# Patient Record
Sex: FEMALE
[De-identification: names, ages, dates, MRNs, and addresses within clinical notes are randomized per-mention and may not be internally consistent; named-entity substitution may affect disease eponyms.]

---

## 2018-05-10 ENCOUNTER — RESULT REVIEW (OUTPATIENT)
Age: 28
End: 2018-05-10

## 2018-12-13 ENCOUNTER — APPOINTMENT (OUTPATIENT)
Dept: ANTEPARTUM | Facility: CLINIC | Age: 28
End: 2018-12-13
Payer: COMMERCIAL

## 2018-12-13 ENCOUNTER — ASOB RESULT (OUTPATIENT)
Age: 28
End: 2018-12-13

## 2018-12-13 ENCOUNTER — APPOINTMENT (OUTPATIENT)
Dept: MATERNAL FETAL MEDICINE | Facility: CLINIC | Age: 28
End: 2018-12-13
Payer: COMMERCIAL

## 2018-12-13 ENCOUNTER — RECORD ABSTRACTING (OUTPATIENT)
Age: 28
End: 2018-12-13

## 2018-12-13 VITALS
BODY MASS INDEX: 26.66 KG/M2 | HEART RATE: 88 BPM | WEIGHT: 160 LBS | HEIGHT: 65 IN | DIASTOLIC BLOOD PRESSURE: 70 MMHG | SYSTOLIC BLOOD PRESSURE: 104 MMHG

## 2018-12-13 DIAGNOSIS — Z87.09 PERSONAL HISTORY OF OTHER DISEASES OF THE RESPIRATORY SYSTEM: ICD-10-CM

## 2018-12-13 DIAGNOSIS — Z84.81 FAMILY HISTORY OF CARRIER OF GENETIC DISEASE: ICD-10-CM

## 2018-12-13 DIAGNOSIS — Z82.79 FAMILY HISTORY OF OTHER CONGENITAL MALFORMATIONS, DEFORMATIONS AND CHROMOSOMAL ABNORMALITIES: ICD-10-CM

## 2018-12-13 LAB — CYTOLOGY CVX/VAG DOC THIN PREP: NORMAL

## 2018-12-13 PROCEDURE — 76811 OB US DETAILED SNGL FETUS: CPT

## 2018-12-13 PROCEDURE — 99243 OFF/OP CNSLTJ NEW/EST LOW 30: CPT

## 2018-12-14 ENCOUNTER — APPOINTMENT (OUTPATIENT)
Dept: OBGYN | Facility: CLINIC | Age: 28
End: 2018-12-14
Payer: COMMERCIAL

## 2018-12-14 VITALS
HEIGHT: 65 IN | BODY MASS INDEX: 26.99 KG/M2 | DIASTOLIC BLOOD PRESSURE: 62 MMHG | WEIGHT: 162 LBS | SYSTOLIC BLOOD PRESSURE: 118 MMHG

## 2018-12-14 PROCEDURE — 81003 URINALYSIS AUTO W/O SCOPE: CPT | Mod: QW

## 2018-12-14 PROCEDURE — 0502F SUBSEQUENT PRENATAL CARE: CPT

## 2018-12-15 LAB
BILIRUB UR QL STRIP: NORMAL
GLUCOSE UR-MCNC: NORMAL
HCG UR QL: 0.2 EU/DL
HGB UR QL STRIP.AUTO: NORMAL
KETONES UR-MCNC: NORMAL
LEUKOCYTE ESTERASE UR QL STRIP: NORMAL
NITRITE UR QL STRIP: NORMAL
PH UR STRIP: 7.5
PROT UR STRIP-MCNC: NORMAL
SP GR UR STRIP: 1.01

## 2019-01-02 ENCOUNTER — TRANSCRIPTION ENCOUNTER (OUTPATIENT)
Age: 29
End: 2019-01-02

## 2019-01-10 ENCOUNTER — APPOINTMENT (OUTPATIENT)
Dept: OBGYN | Facility: CLINIC | Age: 29
End: 2019-01-10
Payer: COMMERCIAL

## 2019-01-10 ENCOUNTER — APPOINTMENT (OUTPATIENT)
Dept: OBGYN | Facility: CLINIC | Age: 29
End: 2019-01-10

## 2019-01-10 VITALS
BODY MASS INDEX: 27.32 KG/M2 | DIASTOLIC BLOOD PRESSURE: 72 MMHG | HEIGHT: 65 IN | SYSTOLIC BLOOD PRESSURE: 110 MMHG | WEIGHT: 164 LBS

## 2019-01-10 DIAGNOSIS — Z00.00 ENCOUNTER FOR GENERAL ADULT MEDICAL EXAMINATION W/OUT ABNORMAL FINDINGS: ICD-10-CM

## 2019-01-10 PROCEDURE — 0502F SUBSEQUENT PRENATAL CARE: CPT

## 2019-01-17 LAB
BILIRUB UR QL STRIP: NORMAL
CLARITY UR: CLEAR
COLLECTION METHOD: NORMAL
GLUCOSE UR-MCNC: NORMAL
HCG UR QL: 0.2 EU/DL
HGB UR QL STRIP.AUTO: NORMAL
KETONES UR-MCNC: NORMAL
LEUKOCYTE ESTERASE UR QL STRIP: NORMAL
NITRITE UR QL STRIP: NORMAL
PH UR STRIP: 6.5
PROT UR STRIP-MCNC: NORMAL
SP GR UR STRIP: 1.01

## 2019-02-07 ENCOUNTER — APPOINTMENT (OUTPATIENT)
Dept: ANTEPARTUM | Facility: CLINIC | Age: 29
End: 2019-02-07
Payer: COMMERCIAL

## 2019-02-07 ENCOUNTER — APPOINTMENT (OUTPATIENT)
Dept: MATERNAL FETAL MEDICINE | Facility: CLINIC | Age: 29
End: 2019-02-07
Payer: COMMERCIAL

## 2019-02-07 ENCOUNTER — ASOB RESULT (OUTPATIENT)
Age: 29
End: 2019-02-07

## 2019-02-07 VITALS
WEIGHT: 167 LBS | BODY MASS INDEX: 27.82 KG/M2 | HEART RATE: 98 BPM | SYSTOLIC BLOOD PRESSURE: 110 MMHG | DIASTOLIC BLOOD PRESSURE: 60 MMHG | HEIGHT: 65 IN

## 2019-02-07 PROCEDURE — 76820 UMBILICAL ARTERY ECHO: CPT

## 2019-02-07 PROCEDURE — 76819 FETAL BIOPHYS PROFIL W/O NST: CPT

## 2019-02-07 PROCEDURE — 76816 OB US FOLLOW-UP PER FETUS: CPT

## 2019-02-07 PROCEDURE — 99242 OFF/OP CONSLTJ NEW/EST SF 20: CPT

## 2019-02-07 RX ORDER — OXYMETAZOLINE HCL 0.05 %
0.05 SPRAY, NON-AEROSOL (ML) NASAL
Refills: 0 | Status: DISCONTINUED | COMMUNITY
End: 2019-02-07

## 2019-02-07 NOTE — DISCUSSION/SUMMARY
[FreeTextEntry1] : Please see the previous consultation for the patient's medical and obsterical history.\par \par Patient is being seen today at approximately 28 weeks for Low WATSON-A at the 5th %. She's currently taking baby aspirin 81 mg p.o. daily. A growth scan was performed today which does reveal a single viable intrauterine gestation with the estimated fetal weight at 3 lbs. 1 oz. which is consistent with the 74th percentile. Vertex presentation with posterior placenta is seen and the amniotic fluid index is normal at 16.59 cm. Umbilical and uterine artery Doppler studies were performed and are within normal limits. Vital signs today reveal a blood pressure of 110/60 and maternal weight is 167 pounds which is a 3 pound weight gain from previous evaluation done on January 10. This is consistent with a BMI of 27.79KG.\par \par Management of the pregnancy was discussed. The patient will continue baby aspirin 81 mg p.o. daily until 36 weeks. A follow up growth scan between 34 and 36 weeks is recommended. The patient is scheduled for her glucose test next week and will return if clinically indicated. All of the above was discussed with the patient and her  and all of her questions were answered.\par \par Recommendations;\par \par #1. Baby aspirin 81 mg p.o. daily until 36 weeks.\par #2. Followup growth scan between 34 and 36 weeks.\par #3. Followup maternal fetal medicine consultation as clinically indicated

## 2019-02-11 ENCOUNTER — APPOINTMENT (OUTPATIENT)
Dept: OBGYN | Facility: CLINIC | Age: 29
End: 2019-02-11
Payer: COMMERCIAL

## 2019-02-11 ENCOUNTER — LABORATORY RESULT (OUTPATIENT)
Age: 29
End: 2019-02-11

## 2019-02-11 VITALS
HEIGHT: 65 IN | WEIGHT: 173 LBS | DIASTOLIC BLOOD PRESSURE: 70 MMHG | SYSTOLIC BLOOD PRESSURE: 118 MMHG | BODY MASS INDEX: 28.82 KG/M2

## 2019-02-11 PROCEDURE — 36415 COLL VENOUS BLD VENIPUNCTURE: CPT

## 2019-02-11 PROCEDURE — 0502F SUBSEQUENT PRENATAL CARE: CPT

## 2019-02-12 ENCOUNTER — OTHER (OUTPATIENT)
Age: 29
End: 2019-02-12

## 2019-02-12 LAB
BASOPHILS # BLD AUTO: 0 K/UL
BASOPHILS NFR BLD AUTO: 0 %
EOSINOPHIL # BLD AUTO: 0.19 K/UL
EOSINOPHIL NFR BLD AUTO: 1.7 %
GLUCOSE 1H P 50 G GLC PO SERPL-MCNC: 150 MG/DL
HCT VFR BLD CALC: 37.4 %
HGB BLD-MCNC: 11.5 G/DL
LYMPHOCYTES # BLD AUTO: 2.09 K/UL
LYMPHOCYTES NFR BLD AUTO: 18.5 %
MAN DIFF?: NORMAL
MCHC RBC-ENTMCNC: 29.9 PG
MCHC RBC-ENTMCNC: 30.7 GM/DL
MCV RBC AUTO: 97.4 FL
MONOCYTES # BLD AUTO: 0.95 K/UL
MONOCYTES NFR BLD AUTO: 8.4 %
NEUTROPHILS # BLD AUTO: 7.9 K/UL
NEUTROPHILS NFR BLD AUTO: 69.8 %
PLATELET # BLD AUTO: 275 K/UL
RBC # BLD: 3.84 M/UL
RBC # FLD: 14.1 %
WBC # FLD AUTO: 11.32 K/UL

## 2019-02-14 ENCOUNTER — RX CHANGE (OUTPATIENT)
Age: 29
End: 2019-02-14

## 2019-02-22 LAB
GLUCOSE 1H P 100 G GLC PO SERPL-MCNC: 197 MG/DL
GLUCOSE 2H P CHAL SERPL-MCNC: 169 MG/DL
GLUCOSE 3H P CHAL SERPL-MCNC: 130 MG/DL
GLUCOSE BS SERPL-MCNC: 76 MG/DL

## 2019-02-27 ENCOUNTER — APPOINTMENT (OUTPATIENT)
Dept: OBGYN | Facility: CLINIC | Age: 29
End: 2019-02-27
Payer: COMMERCIAL

## 2019-02-27 VITALS
BODY MASS INDEX: 28.32 KG/M2 | DIASTOLIC BLOOD PRESSURE: 68 MMHG | HEIGHT: 65 IN | WEIGHT: 170 LBS | SYSTOLIC BLOOD PRESSURE: 118 MMHG

## 2019-02-27 LAB
BILIRUB UR QL STRIP: NORMAL
CLARITY UR: CLEAR
COLLECTION METHOD: NORMAL
GLUCOSE UR-MCNC: NORMAL
HCG UR QL: 0.2 EU/DL
HGB UR QL STRIP.AUTO: ABNORMAL
KETONES UR-MCNC: ABNORMAL
LEUKOCYTE ESTERASE UR QL STRIP: ABNORMAL
NITRITE UR QL STRIP: NORMAL
PH UR STRIP: 6
PROT UR STRIP-MCNC: NORMAL
SP GR UR STRIP: 1.01

## 2019-02-27 PROCEDURE — 0502F SUBSEQUENT PRENATAL CARE: CPT

## 2019-02-27 PROCEDURE — 90715 TDAP VACCINE 7 YRS/> IM: CPT

## 2019-02-27 PROCEDURE — 90471 IMMUNIZATION ADMIN: CPT

## 2019-03-05 ENCOUNTER — ASOB RESULT (OUTPATIENT)
Age: 29
End: 2019-03-05

## 2019-03-05 ENCOUNTER — APPOINTMENT (OUTPATIENT)
Dept: MATERNAL FETAL MEDICINE | Facility: CLINIC | Age: 29
End: 2019-03-05
Payer: COMMERCIAL

## 2019-03-05 VITALS — HEIGHT: 65 IN | BODY MASS INDEX: 27.88 KG/M2 | WEIGHT: 167.31 LBS

## 2019-03-05 PROCEDURE — G0108 DIAB MANAGE TRN  PER INDIV: CPT

## 2019-03-14 ENCOUNTER — APPOINTMENT (OUTPATIENT)
Dept: OBGYN | Facility: CLINIC | Age: 29
End: 2019-03-14
Payer: COMMERCIAL

## 2019-03-14 VITALS
SYSTOLIC BLOOD PRESSURE: 110 MMHG | DIASTOLIC BLOOD PRESSURE: 60 MMHG | HEIGHT: 65 IN | WEIGHT: 169 LBS | BODY MASS INDEX: 28.16 KG/M2

## 2019-03-14 LAB
BILIRUB UR QL STRIP: NORMAL
GLUCOSE UR-MCNC: NORMAL
HCG UR QL: 0.2 EU/DL
HGB UR QL STRIP.AUTO: NORMAL
KETONES UR-MCNC: NORMAL
LEUKOCYTE ESTERASE UR QL STRIP: NORMAL
NITRITE UR QL STRIP: NORMAL
PH UR STRIP: 6
PROT UR STRIP-MCNC: NORMAL
SP GR UR STRIP: 1.02

## 2019-03-14 PROCEDURE — 0502F SUBSEQUENT PRENATAL CARE: CPT

## 2019-03-19 ENCOUNTER — APPOINTMENT (OUTPATIENT)
Dept: ANTEPARTUM | Facility: CLINIC | Age: 29
End: 2019-03-19

## 2019-03-21 ENCOUNTER — ASOB RESULT (OUTPATIENT)
Age: 29
End: 2019-03-21

## 2019-03-21 ENCOUNTER — APPOINTMENT (OUTPATIENT)
Dept: MATERNAL FETAL MEDICINE | Facility: CLINIC | Age: 29
End: 2019-03-21
Payer: COMMERCIAL

## 2019-03-21 ENCOUNTER — APPOINTMENT (OUTPATIENT)
Dept: ANTEPARTUM | Facility: CLINIC | Age: 29
End: 2019-03-21
Payer: COMMERCIAL

## 2019-03-21 VITALS
SYSTOLIC BLOOD PRESSURE: 112 MMHG | WEIGHT: 169 LBS | DIASTOLIC BLOOD PRESSURE: 68 MMHG | HEIGHT: 65 IN | BODY MASS INDEX: 28.16 KG/M2 | HEART RATE: 82 BPM

## 2019-03-21 PROCEDURE — 76820 UMBILICAL ARTERY ECHO: CPT

## 2019-03-21 PROCEDURE — 76819 FETAL BIOPHYS PROFIL W/O NST: CPT

## 2019-03-21 PROCEDURE — 93976 VASCULAR STUDY: CPT

## 2019-03-21 PROCEDURE — 99243 OFF/OP CNSLTJ NEW/EST LOW 30: CPT

## 2019-03-21 PROCEDURE — 76816 OB US FOLLOW-UP PER FETUS: CPT

## 2019-03-21 NOTE — DISCUSSION/SUMMARY
[FreeTextEntry1] : Please see the previous consultation for the patient's medical and obstetrical history.\par \par Since the last consultation the patient has been diagnosed with gestational diabetes and has had nutritional counseling. Evaluation of her diabetic flow sheets reveals that all of her fasting and the majority of her postprandial blood sugars are found to be within normal limits. A growth scan was performed today which does reveal a single viable intrauterne gestation with estimated fetal weight of 5 lbs. 8 oz. which is consistent with the 69th percentile. Vertex presentation with a posterior placenta was seen and the amniotic fluid index is normal at 13.19 cm. Vital signs today reveal blood pressure 112/68 and her weight is 169 pounds which is a 1-1/2 weight gain from evaluation done on March 5th. This is consistent with a BMI of 28.12KG.\par \par Management of the pregnancy was discussed. The patient will continue on baby aspirin until 36 weeks. In addition she will continue with the current ADA diet. Medical intervention is not indicated at this time. She will return in 3 weeks for weekly biophysical profile and NST and will have a growth scan done in one month. A followup consultation with nutrition has been scheduled. All of the above was discussed with the patient and her  and all of their questions were answered.\par \par Recommendations;\par \par #1. Continue current ADA diet.\par #2. Discontinue baby aspirin at 36 weeks.\par #3. Weekly biophysical profile and NST until delivery beginning at 37 weeks.\par #4. Growth scan one month is recommended.\par #5. Followup nutritional consultation has been scheduled.\par #6. Followup maternal fetal medicine consultation if clinically indicated.

## 2019-03-27 ENCOUNTER — APPOINTMENT (OUTPATIENT)
Dept: OBGYN | Facility: CLINIC | Age: 29
End: 2019-03-27
Payer: COMMERCIAL

## 2019-03-27 VITALS
DIASTOLIC BLOOD PRESSURE: 72 MMHG | SYSTOLIC BLOOD PRESSURE: 114 MMHG | WEIGHT: 171 LBS | BODY MASS INDEX: 28.49 KG/M2 | HEIGHT: 65 IN

## 2019-03-27 PROCEDURE — 0502F SUBSEQUENT PRENATAL CARE: CPT

## 2019-03-27 PROCEDURE — 76818 FETAL BIOPHYS PROFILE W/NST: CPT

## 2019-03-28 ENCOUNTER — ASOB RESULT (OUTPATIENT)
Age: 29
End: 2019-03-28

## 2019-03-28 ENCOUNTER — APPOINTMENT (OUTPATIENT)
Dept: MATERNAL FETAL MEDICINE | Facility: CLINIC | Age: 29
End: 2019-03-28
Payer: COMMERCIAL

## 2019-03-28 VITALS — BODY MASS INDEX: 28.2 KG/M2 | WEIGHT: 169.25 LBS | HEIGHT: 65 IN

## 2019-03-28 PROCEDURE — G0108 DIAB MANAGE TRN  PER INDIV: CPT

## 2019-03-30 LAB
BILIRUB UR QL STRIP: NORMAL
COLLECTION METHOD: NORMAL
GLUCOSE UR-MCNC: NORMAL
HCG UR QL: 0.2 EU/DL
HGB UR QL STRIP.AUTO: NORMAL
KETONES UR-MCNC: ABNORMAL
LEUKOCYTE ESTERASE UR QL STRIP: ABNORMAL
NITRITE UR QL STRIP: NORMAL
PH UR STRIP: 6.5
PROT UR STRIP-MCNC: NORMAL
SP GR UR STRIP: 1.02

## 2019-04-04 ENCOUNTER — APPOINTMENT (OUTPATIENT)
Dept: OBGYN | Facility: CLINIC | Age: 29
End: 2019-04-04
Payer: COMMERCIAL

## 2019-04-04 ENCOUNTER — ASOB RESULT (OUTPATIENT)
Age: 29
End: 2019-04-04

## 2019-04-04 VITALS
WEIGHT: 171 LBS | DIASTOLIC BLOOD PRESSURE: 64 MMHG | SYSTOLIC BLOOD PRESSURE: 124 MMHG | BODY MASS INDEX: 28.49 KG/M2 | HEIGHT: 65 IN

## 2019-04-04 PROCEDURE — 76818 FETAL BIOPHYS PROFILE W/NST: CPT

## 2019-04-04 PROCEDURE — 0502F SUBSEQUENT PRENATAL CARE: CPT

## 2019-04-07 LAB
B-HEM STREP SPEC QL CULT: NORMAL
BILIRUB UR QL STRIP: NORMAL
COLLECTION METHOD: NORMAL
GLUCOSE UR-MCNC: NORMAL
HCG UR QL: 0.2 EU/DL
HGB UR QL STRIP.AUTO: NORMAL
HIV1+2 AB SPEC QL IA.RAPID: NONREACTIVE
KETONES UR-MCNC: NORMAL
LEUKOCYTE ESTERASE UR QL STRIP: ABNORMAL
NITRITE UR QL STRIP: NORMAL
PH UR STRIP: 6
PROT UR STRIP-MCNC: NORMAL
SP GR UR STRIP: <=1.005

## 2019-04-09 ENCOUNTER — APPOINTMENT (OUTPATIENT)
Dept: ANTEPARTUM | Facility: CLINIC | Age: 29
End: 2019-04-09

## 2019-04-11 ENCOUNTER — APPOINTMENT (OUTPATIENT)
Dept: OBGYN | Facility: CLINIC | Age: 29
End: 2019-04-11
Payer: COMMERCIAL

## 2019-04-11 VITALS
DIASTOLIC BLOOD PRESSURE: 60 MMHG | BODY MASS INDEX: 28.16 KG/M2 | WEIGHT: 169 LBS | SYSTOLIC BLOOD PRESSURE: 114 MMHG | HEIGHT: 65 IN

## 2019-04-11 LAB
BILIRUB UR QL STRIP: NORMAL
COLLECTION METHOD: NORMAL
GLUCOSE UR-MCNC: NORMAL
HCG UR QL: 0.2 EU/DL
HGB UR QL STRIP.AUTO: NORMAL
KETONES UR-MCNC: NORMAL
LEUKOCYTE ESTERASE UR QL STRIP: ABNORMAL
NITRITE UR QL STRIP: NORMAL
PH UR STRIP: 5.5
PROT UR STRIP-MCNC: NORMAL
SP GR UR STRIP: 1

## 2019-04-11 PROCEDURE — 0502F SUBSEQUENT PRENATAL CARE: CPT

## 2019-04-11 PROCEDURE — 76818 FETAL BIOPHYS PROFILE W/NST: CPT

## 2019-04-16 ENCOUNTER — APPOINTMENT (OUTPATIENT)
Dept: ANTEPARTUM | Facility: CLINIC | Age: 29
End: 2019-04-16
Payer: COMMERCIAL

## 2019-04-16 ENCOUNTER — ASOB RESULT (OUTPATIENT)
Age: 29
End: 2019-04-16

## 2019-04-16 ENCOUNTER — APPOINTMENT (OUTPATIENT)
Dept: MATERNAL FETAL MEDICINE | Facility: CLINIC | Age: 29
End: 2019-04-16
Payer: COMMERCIAL

## 2019-04-16 VITALS
BODY MASS INDEX: 28.49 KG/M2 | DIASTOLIC BLOOD PRESSURE: 78 MMHG | HEIGHT: 65 IN | SYSTOLIC BLOOD PRESSURE: 122 MMHG | WEIGHT: 171 LBS

## 2019-04-16 PROCEDURE — 76816 OB US FOLLOW-UP PER FETUS: CPT

## 2019-04-16 PROCEDURE — 76820 UMBILICAL ARTERY ECHO: CPT

## 2019-04-16 PROCEDURE — 93976 VASCULAR STUDY: CPT

## 2019-04-16 PROCEDURE — 76819 FETAL BIOPHYS PROFIL W/O NST: CPT

## 2019-04-16 PROCEDURE — 99244 OFF/OP CNSLTJ NEW/EST MOD 40: CPT

## 2019-04-16 NOTE — DATA REVIEWED
[FreeTextEntry1] : Sonogram today shows the fetus at the appropriate size at the 41st percentile. \par \par Review of glucose values show excellent control of fasting and post prandial values.

## 2019-04-18 ENCOUNTER — APPOINTMENT (OUTPATIENT)
Dept: OBGYN | Facility: CLINIC | Age: 29
End: 2019-04-18

## 2019-04-18 VITALS
BODY MASS INDEX: 28.16 KG/M2 | HEIGHT: 65 IN | WEIGHT: 169 LBS | DIASTOLIC BLOOD PRESSURE: 66 MMHG | SYSTOLIC BLOOD PRESSURE: 116 MMHG

## 2019-04-20 LAB
BILIRUB UR QL STRIP: NORMAL
COLLECTION METHOD: NORMAL
GLUCOSE UR-MCNC: NORMAL
HCG UR QL: 0.2 EU/DL
HGB UR QL STRIP.AUTO: ABNORMAL
KETONES UR-MCNC: NORMAL
LEUKOCYTE ESTERASE UR QL STRIP: NORMAL
NITRITE UR QL STRIP: NORMAL
PH UR STRIP: 6.5
PROT UR STRIP-MCNC: NORMAL
SP GR UR STRIP: 1.01

## 2019-04-21 ENCOUNTER — INPATIENT (INPATIENT)
Facility: HOSPITAL | Age: 29
LOS: 1 days | Discharge: HOME | End: 2019-04-23
Attending: OBSTETRICS & GYNECOLOGY | Admitting: OBSTETRICS & GYNECOLOGY
Payer: COMMERCIAL

## 2019-04-21 ENCOUNTER — RESULT REVIEW (OUTPATIENT)
Age: 29
End: 2019-04-21

## 2019-04-21 ENCOUNTER — TRANSCRIPTION ENCOUNTER (OUTPATIENT)
Age: 29
End: 2019-04-21

## 2019-04-21 VITALS — HEART RATE: 111 BPM | DIASTOLIC BLOOD PRESSURE: 74 MMHG | SYSTOLIC BLOOD PRESSURE: 123 MMHG

## 2019-04-21 DIAGNOSIS — Z90.89 ACQUIRED ABSENCE OF OTHER ORGANS: Chronic | ICD-10-CM

## 2019-04-21 DIAGNOSIS — Z98.890 OTHER SPECIFIED POSTPROCEDURAL STATES: Chronic | ICD-10-CM

## 2019-04-21 DIAGNOSIS — O47.1 FALSE LABOR AT OR AFTER 37 COMPLETED WEEKS OF GESTATION: ICD-10-CM

## 2019-04-21 LAB
APPEARANCE UR: CLEAR — SIGNIFICANT CHANGE UP
BASOPHILS # BLD AUTO: 0 K/UL — SIGNIFICANT CHANGE UP (ref 0–0.2)
BASOPHILS NFR BLD AUTO: 0.1 % — SIGNIFICANT CHANGE UP (ref 0–2)
BILIRUB UR-MCNC: NEGATIVE — SIGNIFICANT CHANGE UP
BLD GP AB SCN SERPL QL: SIGNIFICANT CHANGE UP
COLOR SPEC: YELLOW — SIGNIFICANT CHANGE UP
DIFF PNL FLD: NEGATIVE — SIGNIFICANT CHANGE UP
EOSINOPHIL # BLD AUTO: 0.1 K/UL — SIGNIFICANT CHANGE UP (ref 0–0.5)
EOSINOPHIL NFR BLD AUTO: 0.6 % — SIGNIFICANT CHANGE UP (ref 0–6)
EPI CELLS # UR: SIGNIFICANT CHANGE UP
GLUCOSE BLDC GLUCOMTR-MCNC: 108 MG/DL — HIGH (ref 70–99)
GLUCOSE BLDC GLUCOMTR-MCNC: 113 MG/DL — HIGH (ref 70–99)
GLUCOSE BLDC GLUCOMTR-MCNC: 80 MG/DL — SIGNIFICANT CHANGE UP (ref 70–99)
GLUCOSE BLDC GLUCOMTR-MCNC: 88 MG/DL — SIGNIFICANT CHANGE UP (ref 70–99)
GLUCOSE UR QL: NEGATIVE MG/DL — SIGNIFICANT CHANGE UP
HCT VFR BLD CALC: 35.9 % — LOW (ref 37–47)
HGB BLD-MCNC: 12.1 G/DL — SIGNIFICANT CHANGE UP (ref 12–16)
KETONES UR-MCNC: NEGATIVE — SIGNIFICANT CHANGE UP
LEUKOCYTE ESTERASE UR-ACNC: NEGATIVE — SIGNIFICANT CHANGE UP
LYMPHOCYTES # BLD AUTO: 18.8 % — LOW (ref 20–55)
LYMPHOCYTES # BLD AUTO: 2.6 K/UL — SIGNIFICANT CHANGE UP (ref 1–4.8)
MCHC RBC-ENTMCNC: 31.3 PG — HIGH (ref 27–31)
MCHC RBC-ENTMCNC: 33.7 G/DL — SIGNIFICANT CHANGE UP (ref 32–36)
MCV RBC AUTO: 92.8 FL — SIGNIFICANT CHANGE UP (ref 81–99)
MONOCYTES # BLD AUTO: 1.1 K/UL — HIGH (ref 0–0.8)
MONOCYTES NFR BLD AUTO: 7.9 % — SIGNIFICANT CHANGE UP (ref 3–10)
NEUTROPHILS # BLD AUTO: 10.1 K/UL — HIGH (ref 1.8–8)
NEUTROPHILS NFR BLD AUTO: 72.2 % — SIGNIFICANT CHANGE UP (ref 37–73)
NITRITE UR-MCNC: NEGATIVE — SIGNIFICANT CHANGE UP
PH UR: 6.5 — SIGNIFICANT CHANGE UP (ref 5–8)
PLATELET # BLD AUTO: 282 K/UL — SIGNIFICANT CHANGE UP (ref 150–400)
PROT UR-MCNC: 15 MG/DL
RBC # BLD: 3.87 M/UL — LOW (ref 4.4–5.2)
RBC # FLD: 13 % — SIGNIFICANT CHANGE UP (ref 11–15.6)
SP GR SPEC: 1.01 — SIGNIFICANT CHANGE UP (ref 1.01–1.02)
T PALLIDUM AB TITR SER: NEGATIVE — SIGNIFICANT CHANGE UP
TYPE + AB SCN PNL BLD: SIGNIFICANT CHANGE UP
UROBILINOGEN FLD QL: NEGATIVE MG/DL — SIGNIFICANT CHANGE UP
WBC # BLD: 14 K/UL — HIGH (ref 4.8–10.8)
WBC # FLD AUTO: 14 K/UL — HIGH (ref 4.8–10.8)

## 2019-04-21 PROCEDURE — 88307 TISSUE EXAM BY PATHOLOGIST: CPT | Mod: 26

## 2019-04-21 PROCEDURE — 59400 OBSTETRICAL CARE: CPT

## 2019-04-21 RX ORDER — SODIUM CHLORIDE 9 MG/ML
3 INJECTION INTRAMUSCULAR; INTRAVENOUS; SUBCUTANEOUS EVERY 8 HOURS
Qty: 0 | Refills: 0 | Status: DISCONTINUED | OUTPATIENT
Start: 2019-04-21 | End: 2019-04-23

## 2019-04-21 RX ORDER — OXYTOCIN 10 UNIT/ML
41.67 VIAL (ML) INJECTION
Qty: 20 | Refills: 0 | Status: DISCONTINUED | OUTPATIENT
Start: 2019-04-21 | End: 2019-04-23

## 2019-04-21 RX ORDER — CITRIC ACID/SODIUM CITRATE 300-500 MG
30 SOLUTION, ORAL ORAL ONCE
Qty: 0 | Refills: 0 | Status: COMPLETED | OUTPATIENT
Start: 2019-04-21 | End: 2019-04-21

## 2019-04-21 RX ORDER — AER TRAVELER 0.5 G/1
1 SOLUTION RECTAL; TOPICAL EVERY 4 HOURS
Qty: 0 | Refills: 0 | Status: DISCONTINUED | OUTPATIENT
Start: 2019-04-21 | End: 2019-04-23

## 2019-04-21 RX ORDER — ACETAMINOPHEN 500 MG
650 TABLET ORAL EVERY 6 HOURS
Qty: 0 | Refills: 0 | Status: DISCONTINUED | OUTPATIENT
Start: 2019-04-21 | End: 2019-04-23

## 2019-04-21 RX ORDER — OXYTOCIN 10 UNIT/ML
333.33 VIAL (ML) INJECTION
Qty: 20 | Refills: 0 | Status: COMPLETED | OUTPATIENT
Start: 2019-04-21 | End: 2019-04-21

## 2019-04-21 RX ORDER — PRAMOXINE HYDROCHLORIDE 150 MG/15G
1 AEROSOL, FOAM RECTAL EVERY 4 HOURS
Qty: 0 | Refills: 0 | Status: DISCONTINUED | OUTPATIENT
Start: 2019-04-21 | End: 2019-04-23

## 2019-04-21 RX ORDER — DOCUSATE SODIUM 100 MG
100 CAPSULE ORAL
Qty: 0 | Refills: 0 | Status: DISCONTINUED | OUTPATIENT
Start: 2019-04-21 | End: 2019-04-23

## 2019-04-21 RX ORDER — DIPHENHYDRAMINE HCL 50 MG
25 CAPSULE ORAL EVERY 6 HOURS
Qty: 0 | Refills: 0 | Status: DISCONTINUED | OUTPATIENT
Start: 2019-04-21 | End: 2019-04-23

## 2019-04-21 RX ORDER — TETANUS TOXOID, REDUCED DIPHTHERIA TOXOID AND ACELLULAR PERTUSSIS VACCINE, ADSORBED 5; 2.5; 8; 8; 2.5 [IU]/.5ML; [IU]/.5ML; UG/.5ML; UG/.5ML; UG/.5ML
0.5 SUSPENSION INTRAMUSCULAR ONCE
Qty: 0 | Refills: 0 | Status: DISCONTINUED | OUTPATIENT
Start: 2019-04-21 | End: 2019-04-23

## 2019-04-21 RX ORDER — OXYTOCIN 10 UNIT/ML
2 VIAL (ML) INJECTION
Qty: 30 | Refills: 0 | Status: DISCONTINUED | OUTPATIENT
Start: 2019-04-21 | End: 2019-04-23

## 2019-04-21 RX ORDER — HYDROCORTISONE 1 %
1 OINTMENT (GRAM) TOPICAL EVERY 4 HOURS
Qty: 0 | Refills: 0 | Status: DISCONTINUED | OUTPATIENT
Start: 2019-04-21 | End: 2019-04-23

## 2019-04-21 RX ORDER — MAGNESIUM HYDROXIDE 400 MG/1
30 TABLET, CHEWABLE ORAL
Qty: 0 | Refills: 0 | Status: DISCONTINUED | OUTPATIENT
Start: 2019-04-21 | End: 2019-04-23

## 2019-04-21 RX ORDER — LANOLIN
1 OINTMENT (GRAM) TOPICAL EVERY 6 HOURS
Qty: 0 | Refills: 0 | Status: DISCONTINUED | OUTPATIENT
Start: 2019-04-21 | End: 2019-04-23

## 2019-04-21 RX ORDER — IBUPROFEN 200 MG
600 TABLET ORAL EVERY 6 HOURS
Qty: 0 | Refills: 0 | Status: DISCONTINUED | OUTPATIENT
Start: 2019-04-21 | End: 2019-04-23

## 2019-04-21 RX ORDER — OXYTOCIN 10 UNIT/ML
333.33 VIAL (ML) INJECTION
Qty: 20 | Refills: 0 | Status: COMPLETED | OUTPATIENT
Start: 2019-04-21

## 2019-04-21 RX ORDER — GLYCERIN ADULT
1 SUPPOSITORY, RECTAL RECTAL AT BEDTIME
Qty: 0 | Refills: 0 | Status: DISCONTINUED | OUTPATIENT
Start: 2019-04-21 | End: 2019-04-23

## 2019-04-21 RX ORDER — SODIUM CHLORIDE 9 MG/ML
1000 INJECTION, SOLUTION INTRAVENOUS
Qty: 0 | Refills: 0 | Status: DISCONTINUED | OUTPATIENT
Start: 2019-04-21 | End: 2019-04-21

## 2019-04-21 RX ORDER — SIMETHICONE 80 MG/1
80 TABLET, CHEWABLE ORAL EVERY 6 HOURS
Qty: 0 | Refills: 0 | Status: DISCONTINUED | OUTPATIENT
Start: 2019-04-21 | End: 2019-04-23

## 2019-04-21 RX ORDER — BUTORPHANOL TARTRATE 2 MG/ML
2 INJECTION, SOLUTION INTRAMUSCULAR; INTRAVENOUS ONCE
Qty: 0 | Refills: 0 | Status: DISCONTINUED | OUTPATIENT
Start: 2019-04-21 | End: 2019-04-21

## 2019-04-21 RX ORDER — DIBUCAINE 1 %
1 OINTMENT (GRAM) RECTAL EVERY 4 HOURS
Qty: 0 | Refills: 0 | Status: DISCONTINUED | OUTPATIENT
Start: 2019-04-21 | End: 2019-04-23

## 2019-04-21 RX ORDER — OXYCODONE AND ACETAMINOPHEN 5; 325 MG/1; MG/1
2 TABLET ORAL EVERY 6 HOURS
Qty: 0 | Refills: 0 | Status: DISCONTINUED | OUTPATIENT
Start: 2019-04-21 | End: 2019-04-23

## 2019-04-21 RX ORDER — SODIUM CHLORIDE 9 MG/ML
1000 INJECTION, SOLUTION INTRAVENOUS
Qty: 0 | Refills: 0 | Status: DISCONTINUED | OUTPATIENT
Start: 2019-04-21 | End: 2019-04-23

## 2019-04-21 RX ORDER — SODIUM CHLORIDE 9 MG/ML
1000 INJECTION, SOLUTION INTRAVENOUS ONCE
Qty: 0 | Refills: 0 | Status: COMPLETED | OUTPATIENT
Start: 2019-04-21 | End: 2019-04-21

## 2019-04-21 RX ADMIN — Medication 2 MILLIUNIT(S)/MIN: at 07:59

## 2019-04-21 RX ADMIN — SODIUM CHLORIDE 2000 MILLILITER(S): 9 INJECTION, SOLUTION INTRAVENOUS at 09:00

## 2019-04-21 RX ADMIN — Medication 1000 MILLIUNIT(S)/MIN: at 15:11

## 2019-04-21 RX ADMIN — BUTORPHANOL TARTRATE 2 MILLIGRAM(S): 2 INJECTION, SOLUTION INTRAMUSCULAR; INTRAVENOUS at 06:10

## 2019-04-21 RX ADMIN — Medication 600 MILLIGRAM(S): at 21:31

## 2019-04-21 RX ADMIN — Medication 30 MILLILITER(S): at 09:12

## 2019-04-21 RX ADMIN — Medication 600 MILLIGRAM(S): at 20:31

## 2019-04-21 RX ADMIN — SODIUM CHLORIDE 125 MILLILITER(S): 9 INJECTION, SOLUTION INTRAVENOUS at 01:15

## 2019-04-21 RX ADMIN — BUTORPHANOL TARTRATE 2 MILLIGRAM(S): 2 INJECTION, SOLUTION INTRAMUSCULAR; INTRAVENOUS at 05:55

## 2019-04-21 NOTE — OB PROVIDER IHI INDUCTION/AUGMENTATION NOTE - NS_CHECKALL_OBGYN_ALL_OB
Induction / Augmentation was discussed/Order was written/Contractions pattern was reviewed/H&P was completed/FHR was reviewed

## 2019-04-21 NOTE — OB PROVIDER H&P - ATTENDING COMMENTS
Agree with above. Patient with h/o GDMA1 and low WATSON-A, admitted for induction of labor due to PROM. We will start Cytotec induction. GBS negative. Anticipate . Plan of care was discussed with the patient. Consent obtained at bedside.

## 2019-04-21 NOTE — OB PROVIDER H&P - ALERT: PERTINENT HISTORY
Follow up Sonogram for Growth/Ultra Screen at 12 Weeks/20 Week Level II Sonogram/1st Trimester Sonogram/BioPhysical Profile(s)/Fetal Non-Stress Test (NST)

## 2019-04-21 NOTE — OB PROVIDER DELIVERY SUMMARY - NSPROVIDERDELIVERYNOTE_OBGYN_ALL_OB_FT
Procedure: Normal vaginal delivery  Findings: Viable male infant delivered in cephalic presentation at 1507, placenta delivered spontaneously and intact at 1511. Apgar 9/9. Weight 3150g (6 lbs 15oz).  Laceration: 2nd degree perineal and 2nd degree right lateral vaginal wall lacerations  Repair: 2-0 Chromic and 3-0 Chromic sutures  Procedure: Patient felt rectal pressure and was found to be fully dilated, +2 station. She pushed effectively for 30 minutes. In conjunction with maternal effort, she delivered a viable male infant. Placenta delivered intact. Perineum and vagina were inspected and a 2nd degree perineal laceration and a 2nd degree right vaginal wall laceration was noted and both were repaired with chromic suture in layers. Excellent hemostasis obtained.  EBL: [id number] Procedure: Normal spontaneous vaginal delivery    Findings: Viable male infant delivered in cephalic presentation at 15:07. No nuchal cord noted. The placenta was delivered spontaneously and intact at 15:11. APGARs 9/9. Weight 3150 g (6 lbs 15oz).    Laceration: 2nd degree perineal and 2nd degree right lateral vaginal wall lacerations    Repaired with: 2-0 Chromic and 3-0 Chromic sutures    Procedure details: Patient felt rectal pressure and was found to be fully dilated, +2 station. She pushed effectively for 30 minutes. In conjunction with maternal effort, she delivered a viable male infant. No nuchal cord was noted. Placenta was delivered intact. Perineum and vagina were inspected and a 2nd degree perineal laceration and a 2nd degree right vaginal wall laceration was noted. Both the perineal and vaginal wall lacerations were repaired with 2-0 and 3-0 chromic sutures in layers. Uterus was firm after uterine massage and IV Pitocin. Patient was noted to have continued moderate lochia, thus the cervix was inspected and no cervical lacerations were noted. No additional vaginal lacerations were noted. A rectal exam was done and rectum was intact. Excellent hemostasis was obtained.     Complications: None

## 2019-04-21 NOTE — DISCHARGE NOTE OB - PATIENT PORTAL LINK FT
You can access the [facility], offered by [facility], by registering with the following website: [email or web address]

## 2019-04-21 NOTE — CHART NOTE - NSCHARTNOTEFT_GEN_A_CORE
Pt is feeling uncomfortable wants to sleep and requests stadol, does not yet want epidural.  Vital Signs Last 24 Hrs  T(C): 36.7 (21 Apr 2019 05:15), Max: 36.7 (21 Apr 2019 03:01)  T(F): 98.06 (21 Apr 2019 05:15), Max: 98.06 (21 Apr 2019 03:01)  HR: 77 (21 Apr 2019 05:15) (69 - 111)  BP: 101/56 (21 Apr 2019 05:15) (101/56 - 123/74)  RR: 16 (21 Apr 2019 05:15) (15 - 16)    FETAL HEART RATE: 125, moderate, + accels, - decels, moderate variability    [place]: irregular contractions    CERVICAL EXAM: deferred at this time    INTERVENTIONS: given the CAT 1 tracing, administer 2mg of Stadol IV    D/V Dr. Masters Pt is feeling uncomfortable wants to sleep and requests stadol, does not yet want epidural.  Vital Signs Last 24 Hrs  T(C): 36.7 (21 Apr 2019 05:15), Max: 36.7 (21 Apr 2019 03:01)  T(F): 98.06 (21 Apr 2019 05:15), Max: 98.06 (21 Apr 2019 03:01)  HR: 77 (21 Apr 2019 05:15) (69 - 111)  BP: 101/56 (21 Apr 2019 05:15) (101/56 - 123/74)  RR: 16 (21 Apr 2019 05:15) (15 - 16)    FETAL HEART RATE: 125, moderate, + accels, - decels, moderate variability    [place]: irregular contractions    CERVICAL EXAM: deferred at this time    INTERVENTIONS: given the CAT 1 tracing, administer 2mg of Stadol IV    D/V Dr. Masters  ATTENDING NOTE    I was asked by Dr. Caraballo to care for patient until 7 am.  Patient was seen at bedside.  She reports painful contractions.  Desires pain management.   moderate variability accels present, no decels irregular contractions. Patient 30 yo P0 at 38+5 days with premature rupture of membranes undergoing Cytotec induction, s/p 2 doses. CAT FHT. She is stable  I agree with above resident assessment and plan.  POOL Masters MD

## 2019-04-21 NOTE — OB PROVIDER H&P - ASSESSMENT
A/P: Patient is a 30 y/o  at 38 weeks and 5 days dated by LMP, with h/o GDMA1 and Low WATSON-A, admitted for induction of labor due to premature rupture of membranes (PROM).     -Admit to L&D  -Induction of labor with Cytotec due to PROM  -GDMA1- Continue FS glucose every 4 hours in latent phase and every 2 hours in active phase.   -GBS negative  -T&S: O+ positive  -CEFM + [place]  -Anticipate     Plan of care was discussed with the patient. Plan for induction of labor with Cytotec was discussed. All of her questions and concerns were discussed.

## 2019-04-21 NOTE — OB PROVIDER H&P - HISTORY OF PRESENT ILLNESS
Patient is a 28yo  at 38 weeks and 5 days dated by LMP coming in with PROM. Denies cxts, VB. +FM.  Pregnancy complicated by: A1GDM, PAPAA 5%, low UE3 and high BHCG.    PMH: sinisitis  PSH: sinus polyp, T&A  Meds: PNV, baby ASA  Allergies: seasonal, NKDA  GYNhx: none  FH: trisomy 21, CHD, BRCA+ in father     FHT: cat I  [place]: irregular  VE: grossly ruptured with positive nitrazine, /-3  US on 19: vertex, posterior placenta, EFW 6lb 15oz  Bedside US confirms vertex    A/P: Patient is a 28yo  at 38 weeks and 5 days dated by LMP coming in with PROM.  -Admit to L&D  -Start on cytotec for IOL due to PROM  -GBS negative  -ABO O+ positive Patient is a 30 y/o  at 38 weeks and 5 days dated by LMP, coming in with PROM. She reports having leakage of fluid since 22:35 on 19. Denies any contractions or vaginal bleeding. +Fetal movement.    Pregnancy complicated by: A1GDM, WATSON-A 5%, low UE3 and high BHCG.    PMH: Sinusitis  GYN Hx: none  PSH: Sinus polypectomy, Tonsillectomy   Meds: PNV, baby ASA (d/c at 36 weeks)  Allergies: seasonal, NKDA  FH: trisomy 21, CHD, BRCA+ in father     FHT: cat I  [place]: irregular  VE: grossly ruptured with positive nitrazine, /-3  US on 19: vertex, posterior placenta, EFW 6lb 15oz  Bedside US confirms vertex

## 2019-04-21 NOTE — DISCHARGE NOTE OB - MEDICATION SUMMARY - MEDICATIONS TO TAKE
I will START or STAY ON the medications listed below when I get home from the hospital:    ibuprofen 600 mg oral tablet  -- 1 tab(s) by mouth every 6 hours, As Needed -for mild pain   -- Do not take this drug if you are pregnant.  It is very important that you take or use this exactly as directed.  Do not skip doses or discontinue unless directed by your doctor.  May cause drowsiness or dizziness.  Obtain medical advice before taking any non-prescription drugs as some may affect the action of this medication.  Take with food or milk.    -- Indication: For pain    Colace 100 mg oral capsule  -- 1 cap(s) by mouth 2 times a day, As Needed -for constipation   -- Medication should be taken with plenty of water.    -- Indication: For constipation

## 2019-04-21 NOTE — DISCHARGE NOTE OB - CARE PLAN
Principal Discharge DX:	 (spontaneous vaginal delivery)  Goal:	recovery  Assessment and plan of treatment:	delivered via spontaneous vaginal delivery. She was transferred to postpartum unit without complications during her stay. Upon discharge she is voiding, tolerating PO, ambulating, and pain is controlled.

## 2019-04-21 NOTE — CHART NOTE - NSCHARTNOTEFT_GEN_A_CORE
Patient is resting comfortably in bed after receiving an epidural. She has no complaints.    FHT: Baseline 130, Moderate variability with episodic minimal variability, +Accelerations, +Intermittent variable decelerations.  [place]: Ctx q2-4 minutes, Pitocin 14 mU    SVE: 8/100/-1, SROM at 22:35 on     A/P: 28 y/o  at 38 5/7 wks GA, admitted for induction of labor for PROM. Currently in active labor.    -Continue Pitocin for augmentation.  -IUPC in place. CEFM + [place].  -Epidural in place for pain management.  -IVF hydration.  -Anticipate .

## 2019-04-21 NOTE — OB PROVIDER H&P - PMH
Diet controlled gestational diabetes mellitus (GDM) in second trimester Diet controlled gestational diabetes mellitus (GDM) in third trimester

## 2019-04-21 NOTE — DISCHARGE NOTE OB - CARE PROVIDER_API CALL
Kelly Caraballo)  OBSGYN  [facility]  [address] [place] [id number]  Phone: [phone]  Fax: [phone]  Follow Up Time:

## 2019-04-21 NOTE — CHART NOTE - NSCHARTNOTEFT_GEN_A_CORE
Patient is resting comfortably in bed. She received Stadol 2 mg IV for pain control at 05:55.     FHT: Category I- recent minimal variability due to Stadol  [place]: Irregular ctx (Unable to continuously monitor contractions)    SVE: 2-3/70/-3, SROM at 22:35 on     A/P: 30 y/o  at 38 5/7 wks GA, admitted for induction of labor for PROM.    -s/p Cytotec 20 mcg PO x 2.  -Start Pitocin.  -IUPC was placed at bedside due to being unable to monitor contractions.  -CEFM + [place].  -IVF hydration.  -Anticipate .    Plan of care was discussed with the patient and her family. All of their questions and concerns were discussed.

## 2019-04-21 NOTE — DISCHARGE NOTE OB - MATERIALS PROVIDED
Vaccinations/[place]  Screening Program/Breastfeeding Log/Breastfeeding Mother’s Support Group Information/Guide to Postpartum Care/Back To Sleep Handout/Shaken Baby Prevention Handout

## 2019-04-22 LAB
BASOPHILS # BLD AUTO: 0 K/UL — SIGNIFICANT CHANGE UP (ref 0–0.2)
BASOPHILS NFR BLD AUTO: 0.3 % — SIGNIFICANT CHANGE UP (ref 0–2)
EOSINOPHIL # BLD AUTO: 0.1 K/UL — SIGNIFICANT CHANGE UP (ref 0–0.5)
EOSINOPHIL NFR BLD AUTO: 0.6 % — SIGNIFICANT CHANGE UP (ref 0–6)
HCT VFR BLD CALC: 31.8 % — LOW (ref 37–47)
HGB BLD-MCNC: 10.5 G/DL — LOW (ref 12–16)
LYMPHOCYTES # BLD AUTO: 14.9 % — LOW (ref 20–55)
LYMPHOCYTES # BLD AUTO: 2.3 K/UL — SIGNIFICANT CHANGE UP (ref 1–4.8)
MCHC RBC-ENTMCNC: 31 PG — SIGNIFICANT CHANGE UP (ref 27–31)
MCHC RBC-ENTMCNC: 33 G/DL — SIGNIFICANT CHANGE UP (ref 32–36)
MCV RBC AUTO: 93.8 FL — SIGNIFICANT CHANGE UP (ref 81–99)
MONOCYTES # BLD AUTO: 1.4 K/UL — HIGH (ref 0–0.8)
MONOCYTES NFR BLD AUTO: 9.2 % — SIGNIFICANT CHANGE UP (ref 3–10)
NEUTROPHILS # BLD AUTO: 11.5 K/UL — HIGH (ref 1.8–8)
NEUTROPHILS NFR BLD AUTO: 74.4 % — HIGH (ref 37–73)
PLATELET # BLD AUTO: 228 K/UL — SIGNIFICANT CHANGE UP (ref 150–400)
RBC # BLD: 3.39 M/UL — LOW (ref 4.4–5.2)
RBC # FLD: 13.2 % — SIGNIFICANT CHANGE UP (ref 11–15.6)
WBC # BLD: 15.5 K/UL — HIGH (ref 4.8–10.8)
WBC # FLD AUTO: 15.5 K/UL — HIGH (ref 4.8–10.8)

## 2019-04-22 RX ADMIN — Medication 600 MILLIGRAM(S): at 03:16

## 2019-04-22 RX ADMIN — Medication 600 MILLIGRAM(S): at 13:18

## 2019-04-22 RX ADMIN — Medication 600 MILLIGRAM(S): at 04:16

## 2019-04-22 RX ADMIN — Medication 600 MILLIGRAM(S): at 21:05

## 2019-04-22 RX ADMIN — Medication 1 TABLET(S): at 13:17

## 2019-04-22 RX ADMIN — Medication 600 MILLIGRAM(S): at 14:00

## 2019-04-22 RX ADMIN — Medication 600 MILLIGRAM(S): at 20:05

## 2019-04-22 NOTE — PROGRESS NOTE ADULT - ASSESSMENT
29y year old  PPD#1 s/p  at 38 wks 5ds gestation. Expected clinical improvement.     Problem:    1. Normal spontaneous vaginal Delivery ()    -Patient is feeling well and hemodynamically stable  -Tolerating PO and voiding  -c/w pain management PRN  -Encourage breast feeding and ambulation  -c/w routine postpartum care  -Plan for d/c on day #2, pending attending's approval

## 2019-04-22 NOTE — PROGRESS NOTE ADULT - SUBJECTIVE AND OBJECTIVE BOX
29y year old  PPD#1 s/p  at 38 wks 5ds gestation.     No acute overnight events. Pain well controlled.   Patient is ambulating, +voiding, +flatus, +BM  Reports minimal lochia.   +breast feeding, -breast tenderness    VS:   Vital Signs Last 24 Hrs  T(C): 36.8 (2019 19:40), Max: 36.8 (2019 19:40)  T(F): 98.2 (2019 19:40), Max: 98.2 (2019 19:40)  HR: 87 (2019 19:40) (57 - 115)  BP: 106/69 (2019 19:40) (84/51 - 138/60)  RR: 18 (2019 19:40) (17 - 18)  SpO2: 98% (2019 11:20) (94% - 99%)    Physical Exam:  General: NAD, AAOx3  Heart: RRR, Normal S1/S2, No m/g/r  Lungs:CTAB  Abdomen: soft, ND, firm fundus palpated at the umbilicus.   Ext:  No cyanosis, no edema, pulses +2 x4, no Cristy's sign, nontender lower extremity bilaterally.    Labs:                        12.1   14.0  )-----------( 282      ( 2019 01:27 )             35.9       MEDICATIONS  (STANDING):  dextrose 5% + lactated ringers. 1000 milliLiter(s) (125 mL/Hr) IV Continuous <Continuous>  diphtheria/tetanus/pertussis (acellular) Vaccine (ADAcel) 0.5 milliLiter(s) IntraMuscular once  oxytocin Infusion 41.667 milliUNIT(s)/Min (125 mL/Hr) IV Continuous <Continuous>  oxytocin Infusion 2 milliUNIT(s)/Min (2 mL/Hr) IV Continuous <Continuous>  prenatal multivitamin 1 Tablet(s) Oral daily  sodium chloride 0.9% lock flush 3 milliLiter(s) IV Push every 8 hours    MEDICATIONS  (PRN):  acetaminophen   Tablet .. 650 milliGRAM(s) Oral every 6 hours PRN Temp greater or equal to 38.5C (101.3F), Mild Pain (1 - 3)  dibucaine 1% Ointment 1 Application(s) Topical every 4 hours PRN Perineal Discomfort  diphenhydrAMINE 25 milliGRAM(s) Oral every 6 hours PRN Itching  docusate sodium 100 milliGRAM(s) Oral two times a day PRN Stool Softening  glycerin Suppository - Adult 1 Suppository(s) Rectal at bedtime PRN Constipation  hydrocortisone 1% Cream 1 Application(s) Topical every 4 hours PRN Moderate to Severe Perineal Pain  ibuprofen  Tablet. 600 milliGRAM(s) Oral every 6 hours PRN Moderate Pain (4 - 6)  lanolin Ointment 1 Application(s) Topical every 6 hours PRN Sore Nipples  magnesium hydroxide Suspension 30 milliLiter(s) Oral two times a day PRN Constipation  oxyCODONE    5 mG/acetaminophen 325 mG 2 Tablet(s) Oral every 6 hours PRN Severe Pain (7 - 10)  pramoxine 1%/zinc 5% Cream 1 Application(s) Topical every 4 hours PRN Moderate to Severe Perineal Pain  simethicone 80 milliGRAM(s) Chew every 6 hours PRN Gas  witch hazel Pads 1 Application(s) Topical every 4 hours PRN Perineal Discomfort

## 2019-04-23 ENCOUNTER — APPOINTMENT (OUTPATIENT)
Dept: ANTEPARTUM | Facility: CLINIC | Age: 29
End: 2019-04-23

## 2019-04-23 VITALS
DIASTOLIC BLOOD PRESSURE: 74 MMHG | TEMPERATURE: 98 F | SYSTOLIC BLOOD PRESSURE: 130 MMHG | OXYGEN SATURATION: 96 % | RESPIRATION RATE: 18 BRPM | HEART RATE: 108 BPM

## 2019-04-23 PROCEDURE — 82962 GLUCOSE BLOOD TEST: CPT

## 2019-04-23 PROCEDURE — 81001 URINALYSIS AUTO W/SCOPE: CPT

## 2019-04-23 PROCEDURE — 86780 TREPONEMA PALLIDUM: CPT

## 2019-04-23 PROCEDURE — 59050 FETAL MONITOR W/REPORT: CPT

## 2019-04-23 PROCEDURE — G0463: CPT

## 2019-04-23 PROCEDURE — 86901 BLOOD TYPING SEROLOGIC RH(D): CPT

## 2019-04-23 PROCEDURE — 59025 FETAL NON-STRESS TEST: CPT

## 2019-04-23 PROCEDURE — 36415 COLL VENOUS BLD VENIPUNCTURE: CPT

## 2019-04-23 PROCEDURE — 86850 RBC ANTIBODY SCREEN: CPT

## 2019-04-23 PROCEDURE — 86900 BLOOD TYPING SEROLOGIC ABO: CPT

## 2019-04-23 PROCEDURE — 88307 TISSUE EXAM BY PATHOLOGIST: CPT

## 2019-04-23 PROCEDURE — 85027 COMPLETE CBC AUTOMATED: CPT

## 2019-04-23 RX ORDER — IBUPROFEN 200 MG
1 TABLET ORAL
Qty: 28 | Refills: 0
Start: 2019-04-23 | End: 2019-04-29

## 2019-04-23 RX ORDER — DOCUSATE SODIUM 100 MG
1 CAPSULE ORAL
Qty: 14 | Refills: 0
Start: 2019-04-23 | End: 2019-04-29

## 2019-04-23 RX ADMIN — Medication 600 MILLIGRAM(S): at 05:25

## 2019-04-23 RX ADMIN — Medication 600 MILLIGRAM(S): at 14:38

## 2019-04-23 RX ADMIN — Medication 1 TABLET(S): at 13:58

## 2019-04-23 RX ADMIN — Medication 600 MILLIGRAM(S): at 13:58

## 2019-04-23 NOTE — PROGRESS NOTE ADULT - SUBJECTIVE AND OBJECTIVE BOX
S: Patient doing well. Minimal lochia. No complaints.     O: Vital Signs Last 24 Hrs  T(C): 36.8 (2019 08:22), Max: 36.8 (2019 08:22)  T(F): 98.2 (2019 08:22), Max: 98.2 (2019 08:22)  HR: 64 (2019 08:22) (64 - 64)  BP: 102/68 (2019 08:22) (102/68 - 102/68)  BP(mean): --  RR: 18 (2019 08:22) (18 - 18)  SpO2: --    Gen: NAD  Abd: soft, NT, ND, fundus firm below umbilicus  Ext: no tenderness    Labs:                        10.5   15.5  )-----------( 228      ( 2019 07:54 )             31.8        Rubella status:    A: 29y PPD# 2 s/p      Doing well    Plan:  [ ] Discharge home.  Nothing in vagina and no heavy lifting for 6 weeks.   Follow up 6 weeks for post partum visit.  Call office for any fevers, chills, heavy vaginal bleeding, symptoms of depression, or any other concerning symptoms.  Continue motrin 600 mg every 6 hours.

## 2019-04-23 NOTE — PROGRESS NOTE ADULT - SUBJECTIVE AND OBJECTIVE BOX
29y  s/p  at 38 5/7wks gestation PPD#2.   Patient seen and examined at bedside, no acute overnight events.   Patient is ambulating, +eating, +PO hydration, +voiding, +Flatus, -BM, +Formula feeding, +Breast feeding and pain is well controlled.  Denies headache, SOB, fever, chills and calf pain.    VS:   Vital Signs Last 24 Hrs  T(C): 36.6 (2019 08:10), Max: 36.6 (2019 08:10)  T(F): 97.8 (2019 08:10), Max: 97.8 (2019 08:10)  HR: 77 (2019 08:10) (77 - 77)  BP: 91/60 (2019 08:10) (91/60 - 91/60)  RR: 18 (2019 08:10) (18 - 18)    Physical Exam:  General: NAD  Breast: No tenderness or abnormal discharge.  Abdomen: +BS, soft, ND, minimally tender, Fundus firm at level of umbilicus.   Pelvic: Minimal lochia  Ext: No cyanosis, edema or calf tenderness.     Labs:                        10.5   15.5  )-----------( 228      ( 2019 07:54 )             31.8       Medication:  MEDICATIONS  (STANDING):  dextrose 5% + lactated ringers. 1000 milliLiter(s) (125 mL/Hr) IV Continuous <Continuous>  diphtheria/tetanus/pertussis (acellular) Vaccine (ADAcel) 0.5 milliLiter(s) IntraMuscular once  oxytocin Infusion 41.667 milliUNIT(s)/Min (125 mL/Hr) IV Continuous <Continuous>  oxytocin Infusion 2 milliUNIT(s)/Min (2 mL/Hr) IV Continuous <Continuous>  prenatal multivitamin 1 Tablet(s) Oral daily  sodium chloride 0.9% lock flush 3 milliLiter(s) IV Push every 8 hours    MEDICATIONS  (PRN):  acetaminophen   Tablet .. 650 milliGRAM(s) Oral every 6 hours PRN Temp greater or equal to 38.5C (101.3F), Mild Pain (1 - 3)  dibucaine 1% Ointment 1 Application(s) Topical every 4 hours PRN Perineal Discomfort  diphenhydrAMINE 25 milliGRAM(s) Oral every 6 hours PRN Itching  docusate sodium 100 milliGRAM(s) Oral two times a day PRN Stool Softening  glycerin Suppository - Adult 1 Suppository(s) Rectal at bedtime PRN Constipation  hydrocortisone 1% Cream 1 Application(s) Topical every 4 hours PRN Moderate to Severe Perineal Pain  ibuprofen  Tablet. 600 milliGRAM(s) Oral every 6 hours PRN Moderate Pain (4 - 6)  lanolin Ointment 1 Application(s) Topical every 6 hours PRN Sore Nipples  magnesium hydroxide Suspension 30 milliLiter(s) Oral two times a day PRN Constipation  oxyCODONE    5 mG/acetaminophen 325 mG 2 Tablet(s) Oral every 6 hours PRN Severe Pain (7 - 10)  pramoxine 1%/zinc 5% Cream 1 Application(s) Topical every 4 hours PRN Moderate to Severe Perineal Pain  simethicone 80 milliGRAM(s) Chew every 6 hours PRN Gas  witch hazel Pads 1 Application(s) Topical every 4 hours PRN Perineal Discomfort

## 2019-04-23 NOTE — PROGRESS NOTE ADULT - ASSESSMENT
29y  s/p  at 38 5/7wks gestation PPD#2.     1. Normal spontaneous vaginal Delivery ()  -Patient is feeling well and hemodynamically stable, meeting expected milestones.  -Encourage breast feeding and mother-to-baby interaction   -Tolerating PO, voiding and bowel function nml  -C/w pain management PRN.  -Encourage ambulation. If patient is not ambulating please use SCDs for DVT ppx   -C/w routine postpartum care and education   -Plan for d/c on 19, pending attending's approval

## 2019-04-25 ENCOUNTER — APPOINTMENT (OUTPATIENT)
Dept: OBGYN | Facility: CLINIC | Age: 29
End: 2019-04-25

## 2019-04-26 ENCOUNTER — APPOINTMENT (OUTPATIENT)
Dept: OBGYN | Facility: HOSPITAL | Age: 29
End: 2019-04-26

## 2019-04-26 LAB — SURGICAL PATHOLOGY STUDY: SIGNIFICANT CHANGE UP

## 2019-05-13 ENCOUNTER — APPOINTMENT (OUTPATIENT)
Dept: OBGYN | Facility: CLINIC | Age: 29
End: 2019-05-13
Payer: COMMERCIAL

## 2019-05-13 ENCOUNTER — RX RENEWAL (OUTPATIENT)
Age: 29
End: 2019-05-13

## 2019-05-13 LAB
BILIRUB UR QL STRIP: NORMAL
GLUCOSE UR-MCNC: NORMAL
HCG UR QL: 0.2 EU/DL
HCG UR QL: NEGATIVE
HGB UR QL STRIP.AUTO: NORMAL
KETONES UR-MCNC: NORMAL
LEUKOCYTE ESTERASE UR QL STRIP: ABNORMAL
NITRITE UR QL STRIP: NORMAL
PH UR STRIP: 6
PROT UR STRIP-MCNC: NORMAL
QUALITY CONTROL: YES
SP GR UR STRIP: 1.02

## 2019-05-13 PROCEDURE — 81003 URINALYSIS AUTO W/O SCOPE: CPT | Mod: QW

## 2019-05-13 PROCEDURE — 81025 URINE PREGNANCY TEST: CPT

## 2019-05-13 PROCEDURE — 0503F POSTPARTUM CARE VISIT: CPT

## 2019-05-14 PROBLEM — O24.410 GESTATIONAL DIABETES MELLITUS IN PREGNANCY, DIET CONTROLLED: Chronic | Status: ACTIVE | Noted: 2019-04-21

## 2019-06-04 ENCOUNTER — APPOINTMENT (OUTPATIENT)
Dept: OBGYN | Facility: CLINIC | Age: 29
End: 2019-06-04
Payer: COMMERCIAL

## 2019-06-04 VITALS
BODY MASS INDEX: 26.46 KG/M2 | WEIGHT: 155 LBS | HEIGHT: 64 IN | SYSTOLIC BLOOD PRESSURE: 112 MMHG | DIASTOLIC BLOOD PRESSURE: 68 MMHG

## 2019-06-04 PROCEDURE — 81003 URINALYSIS AUTO W/O SCOPE: CPT | Mod: QW

## 2019-06-04 PROCEDURE — 99213 OFFICE O/P EST LOW 20 MIN: CPT

## 2019-06-04 PROCEDURE — 0503F POSTPARTUM CARE VISIT: CPT

## 2019-06-04 PROCEDURE — 81025 URINE PREGNANCY TEST: CPT

## 2019-06-05 LAB
BILIRUB UR QL STRIP: ABNORMAL
CANDIDA VAG CYTO: NOT DETECTED
G VAGINALIS+PREV SP MTYP VAG QL MICRO: NOT DETECTED
GLUCOSE 1H P 100 G GLC PO SERPL-MCNC: 166 MG/DL
GLUCOSE 2H P 100 G GLC PO SERPL-MCNC: 83 MG/DL
GLUCOSE BS SERPL-MCNC: 84 MG/DL
GLUCOSE UR-MCNC: NORMAL
HCG UR QL: 0.2 EU/DL
HCG UR QL: NEGATIVE
HGB UR QL STRIP.AUTO: NORMAL
KETONES UR-MCNC: NORMAL
LEUKOCYTE ESTERASE UR QL STRIP: ABNORMAL
NITRITE UR QL STRIP: NORMAL
PH UR STRIP: 5.5
PROT UR STRIP-MCNC: NORMAL
QUALITY CONTROL: YES
SP GR UR STRIP: 1.02
T VAGINALIS VAG QL WET PREP: NOT DETECTED

## 2019-07-23 ENCOUNTER — APPOINTMENT (OUTPATIENT)
Dept: OBGYN | Facility: CLINIC | Age: 29
End: 2019-07-23
Payer: COMMERCIAL

## 2019-07-23 VITALS
DIASTOLIC BLOOD PRESSURE: 64 MMHG | SYSTOLIC BLOOD PRESSURE: 116 MMHG | WEIGHT: 164 LBS | HEIGHT: 64 IN | BODY MASS INDEX: 28 KG/M2

## 2019-07-23 LAB
BILIRUB UR QL STRIP: NORMAL
GLUCOSE UR-MCNC: NORMAL
HCG UR QL: 0.2 EU/DL
HCG UR QL: NEGATIVE
HGB UR QL STRIP.AUTO: NORMAL
KETONES UR-MCNC: NORMAL
LEUKOCYTE ESTERASE UR QL STRIP: NORMAL
NITRITE UR QL STRIP: NORMAL
PH UR STRIP: 5.5
PROT UR STRIP-MCNC: NORMAL
QUALITY CONTROL: YES
SP GR UR STRIP: 1.01

## 2019-07-23 PROCEDURE — 81003 URINALYSIS AUTO W/O SCOPE: CPT | Mod: QW

## 2019-07-23 PROCEDURE — 99395 PREV VISIT EST AGE 18-39: CPT

## 2019-07-23 PROCEDURE — 81025 URINE PREGNANCY TEST: CPT

## 2019-07-25 LAB — HPV HIGH+LOW RISK DNA PNL CVX: NOT DETECTED

## 2019-08-09 LAB — CYTOLOGY CVX/VAG DOC THIN PREP: ABNORMAL

## 2020-05-07 NOTE — PHYSICAL EXAM
[Alert] : alert [Awake] : awake [Acute Distress] : no acute distress [Mass] : no breast mass [Nipple Discharge] : no nipple discharge [Axillary LAD] : no axillary lymphadenopathy [Soft] : soft [Tender] : non tender [Distended] : not distended [Depressed Mood] : not depressed [Oriented x3] : oriented to person, place, and time [Flat Affect] : affect not flat [Labia Majora Erythema] : no erythema of the labia majora [No Lesions] : no genitalia lesions [Labia Minora Erythema] : no erythema of the labia minora [Normal] : uterus [Pink Rugae] : pink rugae [Labia Majora] : labia major [Labia Minora] : labia minora [Moderate] : moderate [Discharge] : a  ~M vaginal discharge was present [Foul Smelling] : not foul smelling [Thin] : thin [White] : white [Mucoid] : mucoid [No Bleeding] : there was no active vaginal bleeding [Motion Tenderness] : there was no cervical motion tenderness [Tenderness] : nontender [Normal Position] : in a normal position [Enlarged ___ wks] : not enlarged [Mass ___ cm] : no uterine mass was palpated [Ovarian Mass (___ Cm)] : there were no adnexal masses [Adnexa Tenderness] : were not tender [Uterine Adnexae] : were not tender and not enlarged

## 2020-05-09 NOTE — PHYSICAL EXAM
[Awake] : awake [Alert] : alert [Acute Distress] : no acute distress [Mass] : no breast mass [Nipple Discharge] : no nipple discharge [Axillary LAD] : no axillary lymphadenopathy [Soft] : soft [Tender] : non tender [Distended] : not distended [Depressed Mood] : not depressed [Oriented x3] : oriented to person, place, and time [Labia Majora Erythema] : no erythema of the labia majora [Flat Affect] : affect not flat [No Lesions] : no genitalia lesions [Labia Minora Erythema] : no erythema of the labia minora [Normal] : uterus [Labia Minora] : labia minora [Labia Majora] : labia major [No Bleeding] : there was no active vaginal bleeding [Pap Obtained] : a Pap smear was performed [Pink Rugae] : pink rugae [Tenderness] : nontender [Normal Position] : in a normal position [Motion Tenderness] : there was no cervical motion tenderness [Enlarged ___ wks] : not enlarged [Mass ___ cm] : no uterine mass was palpated [Adnexa Tenderness] : were not tender [Uterine Adnexae] : were not tender and not enlarged [Ovarian Mass (___ Cm)] : there were no adnexal masses

## 2020-05-09 NOTE — HISTORY OF PRESENT ILLNESS
[Last Pap Date: ___] : Last Pap Date: [unfilled] [Male] : Delivery History: baby boy [Delivery Date: ___] : on [unfilled] [] : delivered by vaginal delivery [Breastfeeding] : currently nursing [Fatigue] : fatigue [Wt. ___] : weighing [unfilled] [Postpartum Follow Up] : postpartum follow up [Complications:___] : no complications [None] : The patient is currently asymptomatic [S/Sx PP Depression] : no signs/symptoms of postpartum depression [Heavy Bleeding] : no heavy bleeding [Mild] : mild vaginal bleeding [Normal] : the vagina was normal [Doing Well] : is doing well [No Sign of Infection] : is showing no signs of infection [de-identified] : BABY BOY: SHANIA  [de-identified] : PUMPING AND BOTTLEFEEDING  [de-identified] : s/p  on 19. [de-identified] : Patient is doing well. +Tolerating diet. +Voiding. +Bowel movements. +Mild to moderate lochia. No depressive symptoms. [de-identified] : Abd: Soft, non-tender, non-distended. // Pelvic: Second degree perineal laceration repair- c/d/i. Healing well with no signs of infection. [de-identified] : 1) Patient is doing well postpartum. No depressive symptoms. // 2) Continue routine postpartum care. // 3) h/o GDMA- 2 hr GTT ordered and to be done 6-8 weeks postpartum. // 4) Follow up in 3 weeks for postpartum visit.

## 2020-05-09 NOTE — HISTORY OF PRESENT ILLNESS
[1 Year Ago] : 1 year ago [Regular Exercise] : regular exercise [Good] : being in good health [Healthy Diet] : a healthy diet [Last Pap ___] : Last cervical pap smear was [unfilled] [HPV Vaccine NA Due to Age] : HPV vaccine not available to patient due to age [Definite:  ___ (Date)] : the last menstrual period was [unfilled] [Menarche Age: ____] : age at menarche was [unfilled] [Monogamous] : is monogamous [Sexually Active] : is sexually active [Male ___] : [unfilled] male [Condoms] : uses condoms [Weight Concerns] : no concerns with her weight [Contraception] : does not use contraception

## 2020-08-13 ENCOUNTER — APPOINTMENT (OUTPATIENT)
Dept: OBGYN | Facility: CLINIC | Age: 30
End: 2020-08-13
Payer: COMMERCIAL

## 2020-08-13 VITALS
BODY MASS INDEX: 26.66 KG/M2 | SYSTOLIC BLOOD PRESSURE: 122 MMHG | DIASTOLIC BLOOD PRESSURE: 70 MMHG | HEIGHT: 65 IN | WEIGHT: 160 LBS

## 2020-08-13 DIAGNOSIS — Z78.9 OTHER SPECIFIED HEALTH STATUS: ICD-10-CM

## 2020-08-13 DIAGNOSIS — O24.410 GESTATIONAL DIABETES MELLITUS IN PREGNANCY, DIET CONTROLLED: ICD-10-CM

## 2020-08-13 LAB
HCG UR QL: POSITIVE
QUALITY CONTROL: YES

## 2020-08-13 PROCEDURE — 81025 URINE PREGNANCY TEST: CPT

## 2020-08-13 PROCEDURE — 99395 PREV VISIT EST AGE 18-39: CPT

## 2020-08-13 PROCEDURE — 99212 OFFICE O/P EST SF 10 MIN: CPT | Mod: 25

## 2020-08-13 RX ORDER — ASPIRIN 81 MG/1
81 TABLET, CHEWABLE ORAL DAILY
Refills: 0 | Status: DISCONTINUED | COMMUNITY
Start: 2019-02-07 | End: 2020-08-13

## 2020-08-13 NOTE — REVIEW OF SYSTEMS
[Painful Periods] : painful periods [Nl] : Hematologic/Lymphatic [Fever] : no fever [Chills] : no chills [Heart Rate Is Fast] : the heart rate was not fast [Chest Pain] : no chest pain [Dyspnea] : no shortness of breath [Palpitations] : no palpitations [Cough] : no cough [SOB on Exertion] : no shortness of breath during exertion [Abdominal Pain] : no abdominal pain [Vomiting] : no vomiting [Constipation] : no constipation [Pelvic Pain] : no pelvic pain [Abn Vag Bleeding] : no abnormal vaginal bleeding

## 2020-08-13 NOTE — PHYSICAL EXAM
[Awake] : awake [Alert] : alert [Mass] : no breast mass [Acute Distress] : no acute distress [Nipple Discharge] : no nipple discharge [Axillary LAD] : no axillary lymphadenopathy [Soft] : soft [Tender] : non tender [Distended] : not distended [Oriented x3] : oriented to person, place, and time [Depressed Mood] : not depressed [Flat Affect] : affect not flat [Normal] : uterus [Labia Minora] : labia minora [Labia Majora] : labia major [No Bleeding] : there was no active vaginal bleeding [Pap Obtained] : a Pap smear was performed [Tenderness] : nontender [Adnexa Tenderness] : were not tender

## 2020-08-13 NOTE — HISTORY OF PRESENT ILLNESS
[Healthy Diet] : a healthy diet [Good] : being in good health [Last Pap ___] : Last cervical pap smear was [unfilled] [Menstrual Problems] : reports abnormal menses [Reproductive Age] : is of reproductive age [Dysmenorrhea] : dysmenorrhea [Pregnancy History] : pregnancy history: [Total Preg ___] : [unfilled] [Full Term ___] : [unfilled] [Living ___] : [unfilled] [Definite:  ___ (Date)] : the last menstrual period was [unfilled] [Menarche Age: ____] : age at menarche was [unfilled] [Sexually Active] : is sexually active [Monogamous] : is monogamous [Male ___] : [unfilled] male [Regular Exercise] : not exercising regularly [Weight Concerns] : no concerns with her weight [Hot Flashes] : no hot flashes [Night Sweats] : no night sweats [Vaginal Itching] : no vaginal itching [Dyspareunia] : no dyspareunia [Mood Changes] : no mood changes [Contraception] : does not use contraception

## 2020-08-17 LAB
C TRACH RRNA SPEC QL NAA+PROBE: NOT DETECTED
HPV HIGH+LOW RISK DNA PNL CVX: NOT DETECTED
N GONORRHOEA RRNA SPEC QL NAA+PROBE: NOT DETECTED
SOURCE TP AMPLIFICATION: NORMAL

## 2020-08-18 LAB — CYTOLOGY CVX/VAG DOC THIN PREP: ABNORMAL

## 2020-09-10 ENCOUNTER — APPOINTMENT (OUTPATIENT)
Dept: OBGYN | Facility: CLINIC | Age: 30
End: 2020-09-10
Payer: COMMERCIAL

## 2020-09-10 ENCOUNTER — NON-APPOINTMENT (OUTPATIENT)
Age: 30
End: 2020-09-10

## 2020-09-10 ENCOUNTER — ASOB RESULT (OUTPATIENT)
Age: 30
End: 2020-09-10

## 2020-09-10 VITALS
DIASTOLIC BLOOD PRESSURE: 70 MMHG | WEIGHT: 165 LBS | HEIGHT: 65 IN | BODY MASS INDEX: 27.49 KG/M2 | SYSTOLIC BLOOD PRESSURE: 118 MMHG | TEMPERATURE: 97.5 F

## 2020-09-10 DIAGNOSIS — Z32.01 ENCOUNTER FOR PREGNANCY TEST, RESULT POSITIVE: ICD-10-CM

## 2020-09-10 DIAGNOSIS — Z82.79 FAMILY HISTORY OF OTHER CONGENITAL MALFORMATIONS, DEFORMATIONS AND CHROMOSOMAL ABNORMALITIES: ICD-10-CM

## 2020-09-10 DIAGNOSIS — Z01.419 ENCOUNTER FOR GYNECOLOGICAL EXAMINATION (GENERAL) (ROUTINE) W/OUT ABNORMAL FINDINGS: ICD-10-CM

## 2020-09-10 DIAGNOSIS — Z83.3 FAMILY HISTORY OF DIABETES MELLITUS: ICD-10-CM

## 2020-09-10 DIAGNOSIS — Z88.9 ALLERGY STATUS TO UNSPECIFIED DRUGS, MEDICAMENTS AND BIOLOGICAL SUBSTANCES: ICD-10-CM

## 2020-09-10 DIAGNOSIS — Z87.42 PERSONAL HISTORY OF OTHER DISEASES OF THE FEMALE GENITAL TRACT: ICD-10-CM

## 2020-09-10 DIAGNOSIS — Z80.3 FAMILY HISTORY OF MALIGNANT NEOPLASM OF BREAST: ICD-10-CM

## 2020-09-10 DIAGNOSIS — Z32.00 ENCOUNTER FOR PREGNANCY TEST, RESULT UNKNOWN: ICD-10-CM

## 2020-09-10 DIAGNOSIS — Z34.92 ENCOUNTER FOR SUPERVISION OF NORMAL PREGNANCY, UNSPECIFIED, SECOND TRIMESTER: ICD-10-CM

## 2020-09-10 DIAGNOSIS — Z01.411 ENCOUNTER FOR GYNECOLOGICAL EXAMINATION (GENERAL) (ROUTINE) WITH ABNORMAL FINDINGS: ICD-10-CM

## 2020-09-10 DIAGNOSIS — Z3A.37 37 WEEKS GESTATION OF PREGNANCY: ICD-10-CM

## 2020-09-10 DIAGNOSIS — Z3A.35 35 WEEKS GESTATION OF PREGNANCY: ICD-10-CM

## 2020-09-10 LAB
BILIRUB UR QL STRIP: NORMAL
GLUCOSE UR-MCNC: NORMAL
HCG UR QL: 0.2 EU/DL
HGB UR QL STRIP.AUTO: NORMAL
KETONES UR-MCNC: ABNORMAL
LEUKOCYTE ESTERASE UR QL STRIP: NORMAL
NITRITE UR QL STRIP: NORMAL
PH UR STRIP: 5.5
PROT UR STRIP-MCNC: NORMAL
SP GR UR STRIP: 1.03

## 2020-09-10 PROCEDURE — 36415 COLL VENOUS BLD VENIPUNCTURE: CPT

## 2020-09-10 PROCEDURE — 0500F INITIAL PRENATAL CARE VISIT: CPT

## 2020-09-10 PROCEDURE — 76817 TRANSVAGINAL US OBSTETRIC: CPT

## 2020-09-15 LAB
ABO + RH PNL BLD: NORMAL
B19V IGG SER QL IA: 7.1 INDEX
B19V IGG+IGM SER-IMP: NORMAL
B19V IGG+IGM SER-IMP: POSITIVE
B19V IGM FLD-ACNC: 0.1
B19V IGM SER-ACNC: NEGATIVE
BASOPHILS # BLD AUTO: 0.05 K/UL
BASOPHILS NFR BLD AUTO: 0.4 %
BLD GP AB SCN SERPL QL: NORMAL
CMV IGG SERPL QL: <0.2 U/ML
CMV IGG SERPL-IMP: NEGATIVE
CMV IGM SERPL QL: <8 AU/ML
CMV IGM SERPL QL: NEGATIVE
EOSINOPHIL # BLD AUTO: 0.16 K/UL
EOSINOPHIL NFR BLD AUTO: 1.4 %
ESTIMATED AVERAGE GLUCOSE: 103 MG/DL
HBA1C MFR BLD HPLC: 5.2 %
HBV SURFACE AG SER QL: NONREACTIVE
HCT VFR BLD CALC: 42.1 %
HGB A MFR BLD: 97.3 %
HGB A2 MFR BLD: 2.7 %
HGB BLD-MCNC: 13.5 G/DL
HGB FRACT BLD-IMP: NORMAL
HIV1+2 AB SPEC QL IA.RAPID: NONREACTIVE
IMM GRANULOCYTES NFR BLD AUTO: 0.3 %
LYMPHOCYTES # BLD AUTO: 2.19 K/UL
LYMPHOCYTES NFR BLD AUTO: 18.7 %
MAN DIFF?: NORMAL
MCHC RBC-ENTMCNC: 31 PG
MCHC RBC-ENTMCNC: 32.1 GM/DL
MCV RBC AUTO: 96.6 FL
MEV IGG FLD QL IA: >300 AU/ML
MEV IGG+IGM SER-IMP: POSITIVE
MONOCYTES # BLD AUTO: 0.76 K/UL
MONOCYTES NFR BLD AUTO: 6.5 %
NEUTROPHILS # BLD AUTO: 8.51 K/UL
NEUTROPHILS NFR BLD AUTO: 72.7 %
PLATELET # BLD AUTO: 341 K/UL
RBC # BLD: 4.36 M/UL
RBC # FLD: 12.8 %
RUBV IGG FLD-ACNC: 4.4 INDEX
RUBV IGG SER-IMP: POSITIVE
T GONDII AB SER-IMP: NEGATIVE
T GONDII AB SER-IMP: NEGATIVE
T GONDII IGG SER QL: <3 IU/ML
T GONDII IGM SER QL: <3 AU/ML
T PALLIDUM AB SER QL IA: NEGATIVE
TSH SERPL-ACNC: 1.51 UIU/ML
WBC # FLD AUTO: 11.71 K/UL

## 2020-09-17 LAB
AR GENE MUT ANL BLD/T: NORMAL
FMR1 GENE MUT ANL BLD/T: NORMAL

## 2020-09-26 LAB — CFTR MUT TESTED BLD/T: NEGATIVE

## 2020-10-08 ENCOUNTER — ASOB RESULT (OUTPATIENT)
Age: 30
End: 2020-10-08

## 2020-10-08 ENCOUNTER — APPOINTMENT (OUTPATIENT)
Dept: OBGYN | Facility: CLINIC | Age: 30
End: 2020-10-08
Payer: COMMERCIAL

## 2020-10-08 ENCOUNTER — NON-APPOINTMENT (OUTPATIENT)
Age: 30
End: 2020-10-08

## 2020-10-08 VITALS — WEIGHT: 165 LBS | BODY MASS INDEX: 27.46 KG/M2 | DIASTOLIC BLOOD PRESSURE: 64 MMHG | SYSTOLIC BLOOD PRESSURE: 118 MMHG

## 2020-10-08 LAB
BILIRUB UR QL STRIP: NORMAL
GLUCOSE UR-MCNC: NORMAL
HCG UR QL: 0.2 EU/DL
HGB UR QL STRIP.AUTO: NORMAL
KETONES UR-MCNC: NORMAL
LEUKOCYTE ESTERASE UR QL STRIP: ABNORMAL
NITRITE UR QL STRIP: NORMAL
PH UR STRIP: 7
PROT UR STRIP-MCNC: NORMAL
SP GR UR STRIP: 1.01

## 2020-10-08 PROCEDURE — 76813 OB US NUCHAL MEAS 1 GEST: CPT

## 2020-10-08 PROCEDURE — 36415 COLL VENOUS BLD VENIPUNCTURE: CPT

## 2020-10-08 PROCEDURE — 90471 IMMUNIZATION ADMIN: CPT

## 2020-10-08 PROCEDURE — 0502F SUBSEQUENT PRENATAL CARE: CPT

## 2020-10-08 PROCEDURE — 90686 IIV4 VACC NO PRSV 0.5 ML IM: CPT

## 2020-10-11 LAB — BACTERIA UR CULT: NORMAL

## 2020-10-13 LAB
APPEARANCE: CLEAR
BACTERIA: NEGATIVE
BILIRUBIN URINE: NEGATIVE
BLOOD URINE: NEGATIVE
COLOR: COLORLESS
GLUCOSE QUALITATIVE U: NEGATIVE
HYALINE CASTS: 0 /LPF
KETONES URINE: NEGATIVE
LEUKOCYTE ESTERASE URINE: NEGATIVE
MICROSCOPIC-UA: NORMAL
NITRITE URINE: NEGATIVE
PH URINE: 7
PROTEIN URINE: NEGATIVE
RED BLOOD CELLS URINE: 1 /HPF
SPECIFIC GRAVITY URINE: 1
SQUAMOUS EPITHELIAL CELLS: 1 /HPF
UROBILINOGEN URINE: NORMAL
WHITE BLOOD CELLS URINE: 1 /HPF

## 2020-10-14 LAB
1ST TRIMESTER DATA: NORMAL
ADDENDUM DOC: NORMAL
AFP PNL SERPL: NORMAL
AFP SERPL-ACNC: NORMAL
CLINICAL BIOCHEMIST REVIEW: NORMAL
FREE BETA HCG 1ST TRIMESTER: NORMAL
Lab: NORMAL
NASAL BONE: PRESENT
NOTES NTD: NORMAL
NT: NORMAL
PAPP-A SERPL-ACNC: NORMAL
TRISOMY 18/3: NORMAL

## 2020-10-21 ENCOUNTER — NON-APPOINTMENT (OUTPATIENT)
Age: 30
End: 2020-10-21

## 2020-10-22 ENCOUNTER — NON-APPOINTMENT (OUTPATIENT)
Age: 30
End: 2020-10-22

## 2020-10-22 VITALS — WEIGHT: 162 LBS | BODY MASS INDEX: 26.99 KG/M2 | HEIGHT: 65 IN

## 2020-10-22 DIAGNOSIS — Z92.29 PERSONAL HISTORY OF OTHER DRUG THERAPY: ICD-10-CM

## 2020-10-22 DIAGNOSIS — Z86.32 PERSONAL HISTORY OF GESTATIONAL DIABETES: ICD-10-CM

## 2020-10-22 DIAGNOSIS — Z34.91 ENCOUNTER FOR SUPERVISION OF NORMAL PREGNANCY, UNSPECIFIED, FIRST TRIMESTER: ICD-10-CM

## 2020-10-22 DIAGNOSIS — O09.899 ABNORMAL HEMATOLOGICAL FINDING ON ANTENATAL SCREENING OF MOTHER: ICD-10-CM

## 2020-10-22 DIAGNOSIS — O28.0 ABNORMAL HEMATOLOGICAL FINDING ON ANTENATAL SCREENING OF MOTHER: ICD-10-CM

## 2020-10-26 ENCOUNTER — APPOINTMENT (OUTPATIENT)
Dept: MATERNAL FETAL MEDICINE | Facility: CLINIC | Age: 30
End: 2020-10-26
Payer: COMMERCIAL

## 2020-10-26 PROCEDURE — 99205 OFFICE O/P NEW HI 60 MIN: CPT | Mod: 95

## 2020-10-26 NOTE — DATA REVIEWED
[FreeTextEntry1] : Labwork was reviewed before the consultation and again with the patient. We noted the WATSON A a the 5th percentile, as well as the reassuring aneuploidy risks. \par \par She understands the increased incidence of IUGR, preeclampsia and  delivery with low WATSON A, and the need for increased surveillance. \par \par We discussed the monitoring of fetal growth in the third trimester, as well as preeclampsia screening.

## 2020-10-26 NOTE — DISCUSSION/SUMMARY
[FreeTextEntry1] : Level 2 sonogram at 20 weeks. \par \par Growth scan with Doppler at 28 weeks, and repeated every 4 weeks thereafter\par \par Weekly fetal testing to begin at 36 weeks.

## 2020-10-26 NOTE — HISTORY OF PRESENT ILLNESS
[FreeTextEntry1] : Molly gives verbal consent for a telephonic consultation to discuss the implications and concerns for low WATSON A in pregnancy

## 2020-11-10 ENCOUNTER — ASOB RESULT (OUTPATIENT)
Age: 30
End: 2020-11-10

## 2020-11-10 ENCOUNTER — APPOINTMENT (OUTPATIENT)
Dept: MATERNAL FETAL MEDICINE | Facility: CLINIC | Age: 30
End: 2020-11-10
Payer: COMMERCIAL

## 2020-11-10 PROCEDURE — 99212 OFFICE O/P EST SF 10 MIN: CPT | Mod: 95

## 2020-11-12 ENCOUNTER — APPOINTMENT (OUTPATIENT)
Dept: OBGYN | Facility: CLINIC | Age: 30
End: 2020-11-12
Payer: COMMERCIAL

## 2020-11-12 ENCOUNTER — NON-APPOINTMENT (OUTPATIENT)
Age: 30
End: 2020-11-12

## 2020-11-12 VITALS — SYSTOLIC BLOOD PRESSURE: 100 MMHG | WEIGHT: 170 LBS | BODY MASS INDEX: 28.29 KG/M2 | DIASTOLIC BLOOD PRESSURE: 60 MMHG

## 2020-11-12 LAB
BILIRUB UR QL STRIP: NORMAL
GLUCOSE UR-MCNC: NORMAL
HCG UR QL: 0.2 EU/DL
HGB UR QL STRIP.AUTO: NORMAL
KETONES UR-MCNC: NORMAL
LEUKOCYTE ESTERASE UR QL STRIP: NORMAL
NITRITE UR QL STRIP: NORMAL
PH UR STRIP: 0.2
PROT UR STRIP-MCNC: NORMAL
SP GR UR STRIP: 1.02

## 2020-11-12 PROCEDURE — 36415 COLL VENOUS BLD VENIPUNCTURE: CPT

## 2020-11-12 PROCEDURE — 0502F SUBSEQUENT PRENATAL CARE: CPT

## 2020-11-19 LAB
1ST TRIMESTER DATA: NORMAL
2ND TRIMESTER DATA: NORMAL
ADDENDUM DOC: NORMAL
AFP PNL SERPL: NORMAL
AFP SERPL-ACNC: NORMAL
AFP SERPL-ACNC: NORMAL
B-HCG FREE SERPL-MCNC: NORMAL
CLINICAL BIOCHEMIST REVIEW: NORMAL
FREE BETA HCG 1ST TRIMESTER: NORMAL
INHIBIN A SERPL-MCNC: NORMAL
NASAL BONE: PRESENT
NOTES NTD: NORMAL
NT: NORMAL
PAPP-A SERPL-ACNC: NORMAL
U ESTRIOL SERPL-SCNC: NORMAL

## 2020-12-02 ENCOUNTER — ASOB RESULT (OUTPATIENT)
Age: 30
End: 2020-12-02

## 2020-12-02 ENCOUNTER — APPOINTMENT (OUTPATIENT)
Dept: ANTEPARTUM | Facility: CLINIC | Age: 30
End: 2020-12-02
Payer: COMMERCIAL

## 2020-12-02 PROCEDURE — 76811 OB US DETAILED SNGL FETUS: CPT

## 2020-12-02 PROCEDURE — 99072 ADDL SUPL MATRL&STAF TM PHE: CPT

## 2020-12-23 ENCOUNTER — NON-APPOINTMENT (OUTPATIENT)
Age: 30
End: 2020-12-23

## 2020-12-23 ENCOUNTER — APPOINTMENT (OUTPATIENT)
Dept: OBGYN | Facility: CLINIC | Age: 30
End: 2020-12-23
Payer: COMMERCIAL

## 2020-12-23 VITALS
HEIGHT: 65 IN | DIASTOLIC BLOOD PRESSURE: 76 MMHG | WEIGHT: 177 LBS | BODY MASS INDEX: 29.49 KG/M2 | SYSTOLIC BLOOD PRESSURE: 122 MMHG

## 2020-12-23 DIAGNOSIS — Z3A.15 15 WEEKS GESTATION OF PREGNANCY: ICD-10-CM

## 2020-12-23 LAB
BILIRUB UR QL STRIP: NORMAL
GLUCOSE UR-MCNC: NORMAL
HCG UR QL: 0.2 EU/DL
HGB UR QL STRIP.AUTO: NORMAL
KETONES UR-MCNC: NORMAL
LEUKOCYTE ESTERASE UR QL STRIP: ABNORMAL
NITRITE UR QL STRIP: NORMAL
PH UR STRIP: 7
PROT UR STRIP-MCNC: NORMAL
SP GR UR STRIP: 1.02

## 2020-12-23 PROCEDURE — 0502F SUBSEQUENT PRENATAL CARE: CPT

## 2020-12-23 PROCEDURE — 81002 URINALYSIS NONAUTO W/O SCOPE: CPT | Mod: NC

## 2020-12-26 ENCOUNTER — TRANSCRIPTION ENCOUNTER (OUTPATIENT)
Age: 30
End: 2020-12-26

## 2020-12-26 LAB
CANDIDA VAG CYTO: NOT DETECTED
G VAGINALIS+PREV SP MTYP VAG QL MICRO: NOT DETECTED
T VAGINALIS VAG QL WET PREP: NOT DETECTED

## 2020-12-30 ENCOUNTER — TRANSCRIPTION ENCOUNTER (OUTPATIENT)
Age: 30
End: 2020-12-30

## 2021-01-13 ENCOUNTER — NON-APPOINTMENT (OUTPATIENT)
Age: 31
End: 2021-01-13

## 2021-01-13 ENCOUNTER — APPOINTMENT (OUTPATIENT)
Dept: OBGYN | Facility: CLINIC | Age: 31
End: 2021-01-13
Payer: COMMERCIAL

## 2021-01-13 VITALS
HEIGHT: 65 IN | BODY MASS INDEX: 30.16 KG/M2 | DIASTOLIC BLOOD PRESSURE: 80 MMHG | SYSTOLIC BLOOD PRESSURE: 138 MMHG | WEIGHT: 181 LBS

## 2021-01-13 PROCEDURE — 36415 COLL VENOUS BLD VENIPUNCTURE: CPT

## 2021-01-13 PROCEDURE — 0502F SUBSEQUENT PRENATAL CARE: CPT

## 2021-01-14 LAB
BASOPHILS # BLD AUTO: 0.03 K/UL
BASOPHILS NFR BLD AUTO: 0.3 %
EOSINOPHIL # BLD AUTO: 0.12 K/UL
EOSINOPHIL NFR BLD AUTO: 1.2 %
HCT VFR BLD CALC: 36.2 %
HGB BLD-MCNC: 11.5 G/DL
IMM GRANULOCYTES NFR BLD AUTO: 1 %
LYMPHOCYTES # BLD AUTO: 1.5 K/UL
LYMPHOCYTES NFR BLD AUTO: 14.4 %
MAN DIFF?: NORMAL
MCHC RBC-ENTMCNC: 31.6 PG
MCHC RBC-ENTMCNC: 31.8 GM/DL
MCV RBC AUTO: 99.5 FL
MONOCYTES # BLD AUTO: 0.48 K/UL
MONOCYTES NFR BLD AUTO: 4.6 %
NEUTROPHILS # BLD AUTO: 8.17 K/UL
NEUTROPHILS NFR BLD AUTO: 78.5 %
PLATELET # BLD AUTO: 266 K/UL
RBC # BLD: 3.64 M/UL
RBC # FLD: 13.1 %
WBC # FLD AUTO: 10.4 K/UL

## 2021-01-15 ENCOUNTER — NON-APPOINTMENT (OUTPATIENT)
Age: 31
End: 2021-01-15

## 2021-01-15 LAB
ALBUMIN SERPL ELPH-MCNC: 3.9 G/DL
ALP BLD-CCNC: 76 U/L
ALT SERPL-CCNC: 11 U/L
ANION GAP SERPL CALC-SCNC: 18 MMOL/L
AST SERPL-CCNC: 13 U/L
BILIRUB SERPL-MCNC: 0.2 MG/DL
BUN SERPL-MCNC: 9 MG/DL
CALCIUM SERPL-MCNC: 9.4 MG/DL
CHLORIDE SERPL-SCNC: 98 MMOL/L
CO2 SERPL-SCNC: 20 MMOL/L
CREAT SERPL-MCNC: 0.74 MG/DL
CREAT SPEC-SCNC: 74 MG/DL
CREAT/PROT UR: 0.2 RATIO
GLUCOSE 1H P 50 G GLC PO SERPL-MCNC: 199 MG/DL
GLUCOSE SERPL-MCNC: 155 MG/DL
LDH SERPL-CCNC: 210 U/L
POTASSIUM SERPL-SCNC: 4.1 MMOL/L
PROT SERPL-MCNC: 6.5 G/DL
PROT UR-MCNC: 17 MG/DL
SODIUM SERPL-SCNC: 137 MMOL/L
URATE SERPL-MCNC: 3.6 MG/DL

## 2021-01-26 ENCOUNTER — ASOB RESULT (OUTPATIENT)
Age: 31
End: 2021-01-26

## 2021-01-26 ENCOUNTER — APPOINTMENT (OUTPATIENT)
Dept: MATERNAL FETAL MEDICINE | Facility: CLINIC | Age: 31
End: 2021-01-26
Payer: COMMERCIAL

## 2021-01-26 PROCEDURE — G0108 DIAB MANAGE TRN  PER INDIV: CPT | Mod: 95

## 2021-01-27 ENCOUNTER — APPOINTMENT (OUTPATIENT)
Dept: OBGYN | Facility: CLINIC | Age: 31
End: 2021-01-27

## 2021-01-28 ENCOUNTER — APPOINTMENT (OUTPATIENT)
Dept: ANTEPARTUM | Facility: CLINIC | Age: 31
End: 2021-01-28
Payer: COMMERCIAL

## 2021-01-28 ENCOUNTER — NON-APPOINTMENT (OUTPATIENT)
Age: 31
End: 2021-01-28

## 2021-01-28 ENCOUNTER — ASOB RESULT (OUTPATIENT)
Age: 31
End: 2021-01-28

## 2021-01-28 ENCOUNTER — APPOINTMENT (OUTPATIENT)
Dept: OBGYN | Facility: CLINIC | Age: 31
End: 2021-01-28
Payer: COMMERCIAL

## 2021-01-28 VITALS
HEIGHT: 65 IN | SYSTOLIC BLOOD PRESSURE: 122 MMHG | DIASTOLIC BLOOD PRESSURE: 70 MMHG | BODY MASS INDEX: 29.49 KG/M2 | WEIGHT: 177 LBS

## 2021-01-28 LAB
BILIRUB UR QL STRIP: NORMAL
COLLECTION METHOD: NORMAL
GLUCOSE UR-MCNC: NORMAL
HCG UR QL: 0.2 EU/DL
HGB UR QL STRIP.AUTO: NORMAL
KETONES UR-MCNC: ABNORMAL
LEUKOCYTE ESTERASE UR QL STRIP: ABNORMAL
NITRITE UR QL STRIP: NORMAL
PH UR STRIP: 7
PROT UR STRIP-MCNC: NORMAL
SP GR UR STRIP: 1.01

## 2021-01-28 PROCEDURE — 99072 ADDL SUPL MATRL&STAF TM PHE: CPT

## 2021-01-28 PROCEDURE — 0502F SUBSEQUENT PRENATAL CARE: CPT

## 2021-01-28 PROCEDURE — 76820 UMBILICAL ARTERY ECHO: CPT

## 2021-01-28 PROCEDURE — 76816 OB US FOLLOW-UP PER FETUS: CPT

## 2021-01-28 PROCEDURE — 93976 VASCULAR STUDY: CPT

## 2021-01-29 ENCOUNTER — NON-APPOINTMENT (OUTPATIENT)
Age: 31
End: 2021-01-29

## 2021-02-02 ENCOUNTER — APPOINTMENT (OUTPATIENT)
Dept: MATERNAL FETAL MEDICINE | Facility: CLINIC | Age: 31
End: 2021-02-02
Payer: COMMERCIAL

## 2021-02-02 PROCEDURE — 99443: CPT | Mod: 95

## 2021-02-02 NOTE — HISTORY OF PRESENT ILLNESS
[FreeTextEntry1] : Molly nava verbal consent for a telephonic consultation to discuss her recent diagnosis of GDM.  She had previously consulted with us regarding her low WATSON A

## 2021-02-02 NOTE — DISCUSSION/SUMMARY
[FreeTextEntry1] : Growth scan in 4 weeks. \par \par Weekly fetal testing to begin at 34-36 weeks. \par \par Medical nutrition next week\par \par Continue testing glucose 4X daily

## 2021-02-09 ENCOUNTER — APPOINTMENT (OUTPATIENT)
Dept: MATERNAL FETAL MEDICINE | Facility: CLINIC | Age: 31
End: 2021-02-09
Payer: COMMERCIAL

## 2021-02-09 ENCOUNTER — ASOB RESULT (OUTPATIENT)
Age: 31
End: 2021-02-09

## 2021-02-09 VITALS — BODY MASS INDEX: 28.99 KG/M2 | HEIGHT: 65 IN | WEIGHT: 174 LBS

## 2021-02-09 PROCEDURE — G0108 DIAB MANAGE TRN  PER INDIV: CPT | Mod: 95

## 2021-02-12 ENCOUNTER — APPOINTMENT (OUTPATIENT)
Dept: OBGYN | Facility: CLINIC | Age: 31
End: 2021-02-12
Payer: COMMERCIAL

## 2021-02-12 ENCOUNTER — NON-APPOINTMENT (OUTPATIENT)
Age: 31
End: 2021-02-12

## 2021-02-12 VITALS
HEIGHT: 65 IN | BODY MASS INDEX: 29.49 KG/M2 | SYSTOLIC BLOOD PRESSURE: 120 MMHG | WEIGHT: 177 LBS | DIASTOLIC BLOOD PRESSURE: 68 MMHG

## 2021-02-12 LAB
BILIRUB UR QL STRIP: NORMAL
GLUCOSE UR-MCNC: NORMAL
HCG UR QL: 0.2 EU/DL
HGB UR QL STRIP.AUTO: NORMAL
KETONES UR-MCNC: 15
LEUKOCYTE ESTERASE UR QL STRIP: ABNORMAL
NITRITE UR QL STRIP: NORMAL
PH UR STRIP: 6.5
PROT UR STRIP-MCNC: NORMAL
SP GR UR STRIP: 1.03

## 2021-02-12 PROCEDURE — 0502F SUBSEQUENT PRENATAL CARE: CPT

## 2021-02-25 ENCOUNTER — APPOINTMENT (OUTPATIENT)
Dept: ANTEPARTUM | Facility: CLINIC | Age: 31
End: 2021-02-25
Payer: COMMERCIAL

## 2021-02-25 ENCOUNTER — ASOB RESULT (OUTPATIENT)
Age: 31
End: 2021-02-25

## 2021-02-25 PROCEDURE — 76816 OB US FOLLOW-UP PER FETUS: CPT

## 2021-02-25 PROCEDURE — 99072 ADDL SUPL MATRL&STAF TM PHE: CPT

## 2021-02-25 PROCEDURE — 76820 UMBILICAL ARTERY ECHO: CPT

## 2021-02-25 PROCEDURE — 93976 VASCULAR STUDY: CPT

## 2021-02-25 PROCEDURE — 76819 FETAL BIOPHYS PROFIL W/O NST: CPT

## 2021-02-26 ENCOUNTER — APPOINTMENT (OUTPATIENT)
Dept: OBGYN | Facility: CLINIC | Age: 31
End: 2021-02-26
Payer: COMMERCIAL

## 2021-02-26 ENCOUNTER — NON-APPOINTMENT (OUTPATIENT)
Age: 31
End: 2021-02-26

## 2021-02-26 VITALS — BODY MASS INDEX: 29.62 KG/M2 | DIASTOLIC BLOOD PRESSURE: 62 MMHG | SYSTOLIC BLOOD PRESSURE: 104 MMHG | WEIGHT: 178 LBS

## 2021-02-26 VITALS — TEMPERATURE: 97.2 F

## 2021-02-26 LAB
BILIRUB UR QL STRIP: NORMAL
GLUCOSE UR-MCNC: NORMAL
HCG UR QL: 0.2 EU/DL
HGB UR QL STRIP.AUTO: NORMAL
KETONES UR-MCNC: 15
LEUKOCYTE ESTERASE UR QL STRIP: ABNORMAL
NITRITE UR QL STRIP: NORMAL
PH UR STRIP: 6
PROT UR STRIP-MCNC: NORMAL
SP GR UR STRIP: 1.02

## 2021-02-26 PROCEDURE — 0502F SUBSEQUENT PRENATAL CARE: CPT

## 2021-02-26 PROCEDURE — 90715 TDAP VACCINE 7 YRS/> IM: CPT

## 2021-02-26 PROCEDURE — 90471 IMMUNIZATION ADMIN: CPT

## 2021-03-03 ENCOUNTER — NON-APPOINTMENT (OUTPATIENT)
Age: 31
End: 2021-03-03

## 2021-03-08 ENCOUNTER — RX RENEWAL (OUTPATIENT)
Age: 31
End: 2021-03-08

## 2021-03-09 ENCOUNTER — APPOINTMENT (OUTPATIENT)
Dept: MATERNAL FETAL MEDICINE | Facility: CLINIC | Age: 31
End: 2021-03-09
Payer: COMMERCIAL

## 2021-03-09 ENCOUNTER — ASOB RESULT (OUTPATIENT)
Age: 31
End: 2021-03-09

## 2021-03-09 VITALS — HEIGHT: 65 IN | WEIGHT: 173 LBS | BODY MASS INDEX: 28.82 KG/M2

## 2021-03-09 LAB
C TRACH RRNA SPEC QL NAA+PROBE: NOT DETECTED
CANDIDA VAG CYTO: NOT DETECTED
G VAGINALIS+PREV SP MTYP VAG QL MICRO: NOT DETECTED
N GONORRHOEA RRNA SPEC QL NAA+PROBE: NOT DETECTED
SOURCE AMPLIFICATION: NORMAL
T VAGINALIS VAG QL WET PREP: NOT DETECTED

## 2021-03-09 PROCEDURE — G0108 DIAB MANAGE TRN  PER INDIV: CPT | Mod: 95

## 2021-03-12 ENCOUNTER — APPOINTMENT (OUTPATIENT)
Dept: OBGYN | Facility: CLINIC | Age: 31
End: 2021-03-12
Payer: COMMERCIAL

## 2021-03-12 ENCOUNTER — NON-APPOINTMENT (OUTPATIENT)
Age: 31
End: 2021-03-12

## 2021-03-12 VITALS
BODY MASS INDEX: 29.32 KG/M2 | HEIGHT: 65 IN | WEIGHT: 176 LBS | DIASTOLIC BLOOD PRESSURE: 60 MMHG | SYSTOLIC BLOOD PRESSURE: 122 MMHG

## 2021-03-12 LAB
BILIRUB UR QL STRIP: NORMAL
GLUCOSE UR-MCNC: NORMAL
HCG UR QL: 0.2 EU/DL
HGB UR QL STRIP.AUTO: NORMAL
KETONES UR-MCNC: 15
LEUKOCYTE ESTERASE UR QL STRIP: NORMAL
NITRITE UR QL STRIP: NORMAL
PH UR STRIP: 6.5
PROT UR STRIP-MCNC: NORMAL
SP GR UR STRIP: 1.01

## 2021-03-12 PROCEDURE — 0502F SUBSEQUENT PRENATAL CARE: CPT

## 2021-03-13 ENCOUNTER — ASOB RESULT (OUTPATIENT)
Age: 31
End: 2021-03-13

## 2021-03-13 ENCOUNTER — APPOINTMENT (OUTPATIENT)
Dept: OBGYN | Facility: CLINIC | Age: 31
End: 2021-03-13
Payer: COMMERCIAL

## 2021-03-13 PROCEDURE — 99072 ADDL SUPL MATRL&STAF TM PHE: CPT

## 2021-03-13 PROCEDURE — 76819 FETAL BIOPHYS PROFIL W/O NST: CPT

## 2021-03-14 ENCOUNTER — NON-APPOINTMENT (OUTPATIENT)
Age: 31
End: 2021-03-14

## 2021-03-18 ENCOUNTER — NON-APPOINTMENT (OUTPATIENT)
Age: 31
End: 2021-03-18

## 2021-03-18 ENCOUNTER — APPOINTMENT (OUTPATIENT)
Dept: OBGYN | Facility: CLINIC | Age: 31
End: 2021-03-18
Payer: COMMERCIAL

## 2021-03-18 ENCOUNTER — ASOB RESULT (OUTPATIENT)
Age: 31
End: 2021-03-18

## 2021-03-18 VITALS
SYSTOLIC BLOOD PRESSURE: 118 MMHG | BODY MASS INDEX: 29.66 KG/M2 | HEIGHT: 65 IN | WEIGHT: 178 LBS | DIASTOLIC BLOOD PRESSURE: 74 MMHG

## 2021-03-18 LAB
BILIRUB UR QL STRIP: NORMAL
GLUCOSE UR-MCNC: NORMAL
HCG UR QL: 0.2 EU/DL
HGB UR QL STRIP.AUTO: NORMAL
KETONES UR-MCNC: ABNORMAL
LEUKOCYTE ESTERASE UR QL STRIP: ABNORMAL
NITRITE UR QL STRIP: NORMAL
PH UR STRIP: 6.5
PROT UR STRIP-MCNC: NORMAL
SP GR UR STRIP: 1.02

## 2021-03-18 PROCEDURE — 76818 FETAL BIOPHYS PROFILE W/NST: CPT

## 2021-03-18 PROCEDURE — 99072 ADDL SUPL MATRL&STAF TM PHE: CPT

## 2021-03-18 PROCEDURE — 0502F SUBSEQUENT PRENATAL CARE: CPT

## 2021-03-18 PROCEDURE — 36415 COLL VENOUS BLD VENIPUNCTURE: CPT

## 2021-03-18 PROCEDURE — 81002 URINALYSIS NONAUTO W/O SCOPE: CPT | Mod: NC

## 2021-03-19 LAB — HIV1+2 AB SPEC QL IA.RAPID: NONREACTIVE

## 2021-03-20 LAB
MEV IGG FLD QL IA: >300 AU/ML
MEV IGG+IGM SER-IMP: POSITIVE

## 2021-03-21 LAB — B-HEM STREP SPEC QL CULT: NORMAL

## 2021-03-25 ENCOUNTER — APPOINTMENT (OUTPATIENT)
Dept: ANTEPARTUM | Facility: CLINIC | Age: 31
End: 2021-03-25
Payer: COMMERCIAL

## 2021-03-25 ENCOUNTER — APPOINTMENT (OUTPATIENT)
Dept: OBGYN | Facility: CLINIC | Age: 31
End: 2021-03-25
Payer: COMMERCIAL

## 2021-03-25 ENCOUNTER — ASOB RESULT (OUTPATIENT)
Age: 31
End: 2021-03-25

## 2021-03-25 ENCOUNTER — NON-APPOINTMENT (OUTPATIENT)
Age: 31
End: 2021-03-25

## 2021-03-25 VITALS
BODY MASS INDEX: 29.49 KG/M2 | WEIGHT: 177 LBS | HEIGHT: 65 IN | DIASTOLIC BLOOD PRESSURE: 74 MMHG | SYSTOLIC BLOOD PRESSURE: 112 MMHG

## 2021-03-25 PROCEDURE — 0502F SUBSEQUENT PRENATAL CARE: CPT

## 2021-03-25 PROCEDURE — 99072 ADDL SUPL MATRL&STAF TM PHE: CPT

## 2021-03-25 PROCEDURE — 76820 UMBILICAL ARTERY ECHO: CPT

## 2021-03-25 PROCEDURE — 76816 OB US FOLLOW-UP PER FETUS: CPT

## 2021-03-28 LAB
BILIRUB UR QL STRIP: NORMAL
CLARITY UR: CLEAR
COLLECTION METHOD: NORMAL
GLUCOSE UR-MCNC: NORMAL
HCG UR QL: 0.2 EU/DL
HGB UR QL STRIP.AUTO: NORMAL
KETONES UR-MCNC: NORMAL
LEUKOCYTE ESTERASE UR QL STRIP: NORMAL
NITRITE UR QL STRIP: NORMAL
PH UR STRIP: 7
PROT UR STRIP-MCNC: NORMAL
SP GR UR STRIP: 1.01

## 2021-03-29 ENCOUNTER — NON-APPOINTMENT (OUTPATIENT)
Age: 31
End: 2021-03-29

## 2021-04-01 ENCOUNTER — APPOINTMENT (OUTPATIENT)
Dept: OBGYN | Facility: CLINIC | Age: 31
End: 2021-04-01
Payer: COMMERCIAL

## 2021-04-01 ENCOUNTER — ASOB RESULT (OUTPATIENT)
Age: 31
End: 2021-04-01

## 2021-04-01 ENCOUNTER — NON-APPOINTMENT (OUTPATIENT)
Age: 31
End: 2021-04-01

## 2021-04-01 VITALS
TEMPERATURE: 97 F | SYSTOLIC BLOOD PRESSURE: 96 MMHG | DIASTOLIC BLOOD PRESSURE: 64 MMHG | WEIGHT: 175 LBS | HEIGHT: 65 IN | BODY MASS INDEX: 29.16 KG/M2

## 2021-04-01 PROCEDURE — 0502F SUBSEQUENT PRENATAL CARE: CPT

## 2021-04-01 PROCEDURE — 99072 ADDL SUPL MATRL&STAF TM PHE: CPT

## 2021-04-01 PROCEDURE — 76818 FETAL BIOPHYS PROFILE W/NST: CPT

## 2021-04-06 ENCOUNTER — NON-APPOINTMENT (OUTPATIENT)
Age: 31
End: 2021-04-06

## 2021-04-06 ENCOUNTER — ASOB RESULT (OUTPATIENT)
Age: 31
End: 2021-04-06

## 2021-04-06 ENCOUNTER — APPOINTMENT (OUTPATIENT)
Dept: MATERNAL FETAL MEDICINE | Facility: CLINIC | Age: 31
End: 2021-04-06
Payer: COMMERCIAL

## 2021-04-06 PROCEDURE — G0108 DIAB MANAGE TRN  PER INDIV: CPT | Mod: 95

## 2021-04-08 ENCOUNTER — APPOINTMENT (OUTPATIENT)
Dept: OBGYN | Facility: CLINIC | Age: 31
End: 2021-04-08
Payer: COMMERCIAL

## 2021-04-08 ENCOUNTER — ASOB RESULT (OUTPATIENT)
Age: 31
End: 2021-04-08

## 2021-04-08 ENCOUNTER — NON-APPOINTMENT (OUTPATIENT)
Age: 31
End: 2021-04-08

## 2021-04-08 VITALS
BODY MASS INDEX: 29.16 KG/M2 | DIASTOLIC BLOOD PRESSURE: 64 MMHG | SYSTOLIC BLOOD PRESSURE: 108 MMHG | WEIGHT: 175 LBS | HEIGHT: 65 IN

## 2021-04-08 PROCEDURE — 0502F SUBSEQUENT PRENATAL CARE: CPT

## 2021-04-08 PROCEDURE — 99072 ADDL SUPL MATRL&STAF TM PHE: CPT

## 2021-04-08 PROCEDURE — 76818 FETAL BIOPHYS PROFILE W/NST: CPT

## 2021-04-09 ENCOUNTER — APPOINTMENT (OUTPATIENT)
Dept: DISASTER EMERGENCY | Facility: CLINIC | Age: 31
End: 2021-04-09

## 2021-04-09 NOTE — OB PROVIDER H&P - HISTORY OF PRESENT ILLNESS
31year old  at 39w3d by LMP who presents to L&D for IOL in setting of GDMA1     TIFFANIE: 20   LMP: 7/10/20     Pregnancy course:   Low WATSON-A   GDMA1     Obhx: 2019 FT 8lbs      Pmhx: denies   Pshx: nasal polypectomy, oral surgery, tonsillectomy   Meds: PNV, ASA 81mg, montelukast, flonase   Allergies: NKDA   BMI: 30   Ultrasound: vertex, posterior placenta (3/25)   EFW: 3750g       ABO: O+  COVID: pending  GBS: negative  HIV: nonreactive  Syphilis: negative  Rubella: Immune  HepB: nonreactive  31year old  at 39w3d by LMP who presents to L&D for IOL in setting of GDMA1. Currently denies any vaginal bleeding, loss of fluid or contractions. Endorses fetal movement.     TIFFANIE: 20   LMP: 7/10/20     Pregnancy course:   Low WATSON-A   GDMA1     Obhx: 2019 FT 8lbs      Pmhx: denies   Pshx: nasal polypectomy, oral surgery, tonsillectomy   Meds: PNV, ASA 81mg, montelukast, flonase   Allergies: NKDA   BMI: 30   Ultrasound: vertex, posterior placenta (3/25)   EFW: 3750g       ABO: O+  COVID: pending  GBS: negative  HIV: nonreactive  Syphilis: negative  Rubella: Immune  HepB: nonreactive  31year old  at 39w3d by LMP who presents to L&D for IOL in setting of GDMA1. Currently denies any vaginal bleeding, loss of fluid or contractions. Endorses fetal movement.     TIFFANIE: 20   LMP: 7/10/20     Pregnancy course:   Low WATSON-A   GDMA1     Obhx: 2019 FT , [id number]    Pmhx: denies   Pshx: nasal polypectomy, oral surgery, tonsillectomy   Meds: PNV, ASA 81mg, montelukast, flonase   Allergies: NKDA   BMI: 30   Ultrasound: vertex, posterior placenta (3/25)   EFW: 3750g       ABO: O+  COVID: pending  GBS: negative  HIV: nonreactive  Syphilis: negative  Rubella: Immune  HepB: nonreactive

## 2021-04-09 NOTE — OB PROVIDER H&P - NSHPPHYSICALEXAM_GEN_ALL_CORE
Gen:   CV:   Pulm:   Abd:   Ext:     Tracing: baseline  [place]: Ctx q  SVE: Gen: NAD AOx3  CV: RRR  Pulm: CTABL  Abd: soft, gravid  Ext: no calf tenderness    Tracing: Cat 1  [place]: q3-4m  SVE: 2/50/-3, intermediate, posterior

## 2021-04-09 NOTE — OB PROVIDER H&P - ATTENDING COMMENTS
Patient admitted for term induction of labor for gestational diabetes.  Patient was counseled regarding induction of labor prior to her admission by her OB.  FHRT reassuring  Favorable cervix: plan for pitocin  pain management: desires epidural  GBS negative   COVID negative  Coleen Roger MD

## 2021-04-09 NOTE — OB PROVIDER H&P - ASSESSMENT
31year old  at 39w3d by LMP who presents to L&D for IOL in setting of GDMA1     A/P: 31y year old G   -Admit to L&D  -Consent  -Admission labs  -IV fluids  -Continuous toco and fetal monitoring   -GBS: Negative, no GBS ppx required   -Analgesia:   -Begin induction     Discussed with   31year old  at 39w3d by LMP admitted for IOL due to GDMA1. Cat 1 tracing. VSS.     A/P: 31y year old G   -Admit to L&D  -Consent  -Admission labs  -IV fluids  -Continuous toco and fetal monitoring   - FS q4h in latent labor, q2h in active labor  -GBS: Negative, no GBS ppx required   -Analgesia: epidural PRN  -Begin induction w/ Pitocin    Discussed with Dr. Roger

## 2021-04-10 LAB — SARS-COV-2 N GENE NPH QL NAA+PROBE: NOT DETECTED

## 2021-04-11 LAB
BILIRUB UR QL STRIP: NORMAL
GLUCOSE UR-MCNC: NORMAL
HCG UR QL: 0.2 EU/DL
HGB UR QL STRIP.AUTO: NORMAL
KETONES UR-MCNC: 15
LEUKOCYTE ESTERASE UR QL STRIP: ABNORMAL
NITRITE UR QL STRIP: NORMAL
PH UR STRIP: 6
PROT UR STRIP-MCNC: NORMAL
SP GR UR STRIP: 1

## 2021-04-12 ENCOUNTER — INPATIENT (INPATIENT)
Facility: HOSPITAL | Age: 31
LOS: 2 days | Discharge: HOME | End: 2021-04-15
Attending: OBSTETRICS & GYNECOLOGY | Admitting: OBSTETRICS & GYNECOLOGY
Payer: COMMERCIAL

## 2021-04-12 VITALS
HEIGHT: 65 IN | SYSTOLIC BLOOD PRESSURE: 133 MMHG | HEART RATE: 93 BPM | DIASTOLIC BLOOD PRESSURE: 76 MMHG | WEIGHT: 173.06 LBS | TEMPERATURE: 98 F | RESPIRATION RATE: 18 BRPM

## 2021-04-12 DIAGNOSIS — Z90.89 ACQUIRED ABSENCE OF OTHER ORGANS: Chronic | ICD-10-CM

## 2021-04-12 DIAGNOSIS — Z98.890 OTHER SPECIFIED POSTPROCEDURAL STATES: Chronic | ICD-10-CM

## 2021-04-12 LAB
ALBUMIN SERPL ELPH-MCNC: 3.8 G/DL — SIGNIFICANT CHANGE UP (ref 3.3–5.2)
ALP SERPL-CCNC: 145 U/L — HIGH (ref 40–120)
ALT FLD-CCNC: 13 U/L — SIGNIFICANT CHANGE UP
ANION GAP SERPL CALC-SCNC: 14 MMOL/L — SIGNIFICANT CHANGE UP (ref 5–17)
AST SERPL-CCNC: 16 U/L — SIGNIFICANT CHANGE UP
BASOPHILS # BLD AUTO: 0.04 K/UL — SIGNIFICANT CHANGE UP (ref 0–0.2)
BASOPHILS NFR BLD AUTO: 0.3 % — SIGNIFICANT CHANGE UP (ref 0–2)
BILIRUB SERPL-MCNC: 0.2 MG/DL — LOW (ref 0.4–2)
BLD GP AB SCN SERPL QL: SIGNIFICANT CHANGE UP
BUN SERPL-MCNC: 14 MG/DL — SIGNIFICANT CHANGE UP (ref 8–20)
CALCIUM SERPL-MCNC: 9.6 MG/DL — SIGNIFICANT CHANGE UP (ref 8.6–10.2)
CHLORIDE SERPL-SCNC: 103 MMOL/L — SIGNIFICANT CHANGE UP (ref 98–107)
CO2 SERPL-SCNC: 20 MMOL/L — LOW (ref 22–29)
CREAT SERPL-MCNC: 0.77 MG/DL — SIGNIFICANT CHANGE UP (ref 0.5–1.3)
EOSINOPHIL # BLD AUTO: 0.22 K/UL — SIGNIFICANT CHANGE UP (ref 0–0.5)
EOSINOPHIL NFR BLD AUTO: 1.4 % — SIGNIFICANT CHANGE UP (ref 0–6)
GLUCOSE SERPL-MCNC: 84 MG/DL — SIGNIFICANT CHANGE UP (ref 70–99)
HCT VFR BLD CALC: 38.8 % — SIGNIFICANT CHANGE UP (ref 34.5–45)
HGB BLD-MCNC: 13.2 G/DL — SIGNIFICANT CHANGE UP (ref 11.5–15.5)
IMM GRANULOCYTES NFR BLD AUTO: 0.6 % — SIGNIFICANT CHANGE UP (ref 0–1.5)
LYMPHOCYTES # BLD AUTO: 20.8 % — SIGNIFICANT CHANGE UP (ref 13–44)
LYMPHOCYTES # BLD AUTO: 3.28 K/UL — SIGNIFICANT CHANGE UP (ref 1–3.3)
MCHC RBC-ENTMCNC: 31.9 PG — SIGNIFICANT CHANGE UP (ref 27–34)
MCHC RBC-ENTMCNC: 34 GM/DL — SIGNIFICANT CHANGE UP (ref 32–36)
MCV RBC AUTO: 93.7 FL — SIGNIFICANT CHANGE UP (ref 80–100)
MONOCYTES # BLD AUTO: 1.25 K/UL — HIGH (ref 0–0.9)
MONOCYTES NFR BLD AUTO: 7.9 % — SIGNIFICANT CHANGE UP (ref 2–14)
NEUTROPHILS # BLD AUTO: 10.87 K/UL — HIGH (ref 1.8–7.4)
NEUTROPHILS NFR BLD AUTO: 69 % — SIGNIFICANT CHANGE UP (ref 43–77)
PLATELET # BLD AUTO: 318 K/UL — SIGNIFICANT CHANGE UP (ref 150–400)
POTASSIUM SERPL-MCNC: 4.1 MMOL/L — SIGNIFICANT CHANGE UP (ref 3.5–5.3)
POTASSIUM SERPL-SCNC: 4.1 MMOL/L — SIGNIFICANT CHANGE UP (ref 3.5–5.3)
PROT SERPL-MCNC: 6.9 G/DL — SIGNIFICANT CHANGE UP (ref 6.6–8.7)
RBC # BLD: 4.14 M/UL — SIGNIFICANT CHANGE UP (ref 3.8–5.2)
RBC # FLD: 13.1 % — SIGNIFICANT CHANGE UP (ref 10.3–14.5)
SODIUM SERPL-SCNC: 137 MMOL/L — SIGNIFICANT CHANGE UP (ref 135–145)
WBC # BLD: 15.76 K/UL — HIGH (ref 3.8–10.5)
WBC # FLD AUTO: 15.76 K/UL — HIGH (ref 3.8–10.5)

## 2021-04-12 RX ORDER — OXYTOCIN 10 UNIT/ML
VIAL (ML) INJECTION
Qty: 30 | Refills: 0 | Status: DISCONTINUED | OUTPATIENT
Start: 2021-04-12 | End: 2021-04-13

## 2021-04-12 RX ORDER — CITRIC ACID/SODIUM CITRATE 300-500 MG
30 SOLUTION, ORAL ORAL ONCE
Refills: 0 | Status: DISCONTINUED | OUTPATIENT
Start: 2021-04-12 | End: 2021-04-13

## 2021-04-12 RX ORDER — SODIUM CHLORIDE 9 MG/ML
1000 INJECTION, SOLUTION INTRAVENOUS
Refills: 0 | Status: DISCONTINUED | OUTPATIENT
Start: 2021-04-12 | End: 2021-04-13

## 2021-04-12 RX ORDER — OXYTOCIN 10 UNIT/ML
333.33 VIAL (ML) INJECTION
Qty: 20 | Refills: 0 | Status: COMPLETED | OUTPATIENT
Start: 2021-04-12 | End: 2021-04-13

## 2021-04-12 RX ADMIN — SODIUM CHLORIDE 125 MILLILITER(S): 9 INJECTION, SOLUTION INTRAVENOUS at 20:50

## 2021-04-12 RX ADMIN — Medication 2 MILLIUNIT(S)/MIN: at 21:27

## 2021-04-13 ENCOUNTER — APPOINTMENT (OUTPATIENT)
Dept: OBGYN | Facility: HOSPITAL | Age: 31
End: 2021-04-13

## 2021-04-13 ENCOUNTER — TRANSCRIPTION ENCOUNTER (OUTPATIENT)
Age: 31
End: 2021-04-13

## 2021-04-13 LAB
COVID-19 SPIKE DOMAIN AB INTERP: NEGATIVE — SIGNIFICANT CHANGE UP
COVID-19 SPIKE DOMAIN ANTIBODY RESULT: 0.4 U/ML — SIGNIFICANT CHANGE UP
GLUCOSE BLDC GLUCOMTR-MCNC: 88 MG/DL — SIGNIFICANT CHANGE UP (ref 70–99)
GLUCOSE BLDC GLUCOMTR-MCNC: 90 MG/DL — SIGNIFICANT CHANGE UP (ref 70–99)
GLUCOSE BLDC GLUCOMTR-MCNC: 94 MG/DL — SIGNIFICANT CHANGE UP (ref 70–99)
SARS-COV-2 IGG+IGM SERPL QL IA: 0.4 U/ML — SIGNIFICANT CHANGE UP
SARS-COV-2 IGG+IGM SERPL QL IA: NEGATIVE — SIGNIFICANT CHANGE UP
T PALLIDUM AB TITR SER: NEGATIVE — SIGNIFICANT CHANGE UP

## 2021-04-13 PROCEDURE — 59400 OBSTETRICAL CARE: CPT

## 2021-04-13 RX ORDER — OXYCODONE HYDROCHLORIDE 5 MG/1
5 TABLET ORAL
Refills: 0 | Status: DISCONTINUED | OUTPATIENT
Start: 2021-04-13 | End: 2021-04-15

## 2021-04-13 RX ORDER — BENZOCAINE 10 %
1 GEL (GRAM) MUCOUS MEMBRANE EVERY 6 HOURS
Refills: 0 | Status: DISCONTINUED | OUTPATIENT
Start: 2021-04-13 | End: 2021-04-15

## 2021-04-13 RX ORDER — OXYCODONE HYDROCHLORIDE 5 MG/1
5 TABLET ORAL ONCE
Refills: 0 | Status: DISCONTINUED | OUTPATIENT
Start: 2021-04-13 | End: 2021-04-15

## 2021-04-13 RX ORDER — PRAMOXINE HYDROCHLORIDE 150 MG/15G
1 AEROSOL, FOAM RECTAL EVERY 4 HOURS
Refills: 0 | Status: DISCONTINUED | OUTPATIENT
Start: 2021-04-13 | End: 2021-04-15

## 2021-04-13 RX ORDER — AER TRAVELER 0.5 G/1
1 SOLUTION RECTAL; TOPICAL EVERY 4 HOURS
Refills: 0 | Status: DISCONTINUED | OUTPATIENT
Start: 2021-04-13 | End: 2021-04-15

## 2021-04-13 RX ORDER — HYDROCORTISONE 1 %
1 OINTMENT (GRAM) TOPICAL EVERY 6 HOURS
Refills: 0 | Status: DISCONTINUED | OUTPATIENT
Start: 2021-04-13 | End: 2021-04-15

## 2021-04-13 RX ORDER — SODIUM CHLORIDE 9 MG/ML
3 INJECTION INTRAMUSCULAR; INTRAVENOUS; SUBCUTANEOUS EVERY 8 HOURS
Refills: 0 | Status: DISCONTINUED | OUTPATIENT
Start: 2021-04-13 | End: 2021-04-15

## 2021-04-13 RX ORDER — ACETAMINOPHEN 500 MG
975 TABLET ORAL
Refills: 0 | Status: DISCONTINUED | OUTPATIENT
Start: 2021-04-13 | End: 2021-04-15

## 2021-04-13 RX ORDER — KETOROLAC TROMETHAMINE 30 MG/ML
30 SYRINGE (ML) INJECTION ONCE
Refills: 0 | Status: DISCONTINUED | OUTPATIENT
Start: 2021-04-13 | End: 2021-04-13

## 2021-04-13 RX ORDER — SODIUM CHLORIDE 9 MG/ML
1000 INJECTION, SOLUTION INTRAVENOUS
Refills: 0 | Status: DISCONTINUED | OUTPATIENT
Start: 2021-04-13 | End: 2021-04-15

## 2021-04-13 RX ORDER — DIBUCAINE 1 %
1 OINTMENT (GRAM) RECTAL EVERY 6 HOURS
Refills: 0 | Status: DISCONTINUED | OUTPATIENT
Start: 2021-04-13 | End: 2021-04-15

## 2021-04-13 RX ORDER — SIMETHICONE 80 MG/1
80 TABLET, CHEWABLE ORAL EVERY 4 HOURS
Refills: 0 | Status: DISCONTINUED | OUTPATIENT
Start: 2021-04-13 | End: 2021-04-15

## 2021-04-13 RX ORDER — IBUPROFEN 200 MG
600 TABLET ORAL EVERY 6 HOURS
Refills: 0 | Status: COMPLETED | OUTPATIENT
Start: 2021-04-13 | End: 2022-03-12

## 2021-04-13 RX ORDER — MONTELUKAST 4 MG/1
0 TABLET, CHEWABLE ORAL
Qty: 0 | Refills: 0 | DISCHARGE

## 2021-04-13 RX ORDER — TETANUS TOXOID, REDUCED DIPHTHERIA TOXOID AND ACELLULAR PERTUSSIS VACCINE, ADSORBED 5; 2.5; 8; 8; 2.5 [IU]/.5ML; [IU]/.5ML; UG/.5ML; UG/.5ML; UG/.5ML
0.5 SUSPENSION INTRAMUSCULAR ONCE
Refills: 0 | Status: DISCONTINUED | OUTPATIENT
Start: 2021-04-13 | End: 2021-04-15

## 2021-04-13 RX ORDER — LANOLIN
1 OINTMENT (GRAM) TOPICAL EVERY 6 HOURS
Refills: 0 | Status: DISCONTINUED | OUTPATIENT
Start: 2021-04-13 | End: 2021-04-15

## 2021-04-13 RX ORDER — OXYTOCIN 10 UNIT/ML
333.33 VIAL (ML) INJECTION
Qty: 20 | Refills: 0 | Status: DISCONTINUED | OUTPATIENT
Start: 2021-04-13 | End: 2021-04-15

## 2021-04-13 RX ORDER — IBUPROFEN 200 MG
600 TABLET ORAL EVERY 6 HOURS
Refills: 0 | Status: DISCONTINUED | OUTPATIENT
Start: 2021-04-13 | End: 2021-04-15

## 2021-04-13 RX ORDER — MAGNESIUM HYDROXIDE 400 MG/1
30 TABLET, CHEWABLE ORAL
Refills: 0 | Status: DISCONTINUED | OUTPATIENT
Start: 2021-04-13 | End: 2021-04-15

## 2021-04-13 RX ORDER — DIPHENHYDRAMINE HCL 50 MG
25 CAPSULE ORAL EVERY 6 HOURS
Refills: 0 | Status: DISCONTINUED | OUTPATIENT
Start: 2021-04-13 | End: 2021-04-15

## 2021-04-13 RX ADMIN — Medication 600 MILLIGRAM(S): at 18:30

## 2021-04-13 RX ADMIN — Medication 30 MILLIGRAM(S): at 11:25

## 2021-04-13 RX ADMIN — Medication 600 MILLIGRAM(S): at 17:53

## 2021-04-13 RX ADMIN — SODIUM CHLORIDE 3 MILLILITER(S): 9 INJECTION INTRAMUSCULAR; INTRAVENOUS; SUBCUTANEOUS at 20:56

## 2021-04-13 RX ADMIN — Medication 975 MILLIGRAM(S): at 20:56

## 2021-04-13 RX ADMIN — Medication 1000 MILLIUNIT(S)/MIN: at 11:25

## 2021-04-13 RX ADMIN — Medication 975 MILLIGRAM(S): at 21:56

## 2021-04-13 RX ADMIN — Medication 600 MILLIGRAM(S): at 23:55

## 2021-04-13 NOTE — OB RN DELIVERY SUMMARY - NS_SEPSISRSKCALC_OBGYN_ALL_OB_FT
EOS calculated successfully. EOS Risk Factor: 0.5/1000 live births ([facility] national incidence); GA=39w4d; Temp=98.78; ROM=1.85; GBS='Negative'; Antibiotics='No antibiotics or any antibiotics < 2 hrs prior to birth'

## 2021-04-13 NOTE — OB PROVIDER LABOR PROGRESS NOTE - NS_OBIHIFHRDETAILS_OBGYN_ALL_OB_FT
120 baseline, moderate variability, + accels, no decels 130 baseline, moderate variability, + accels, no decels

## 2021-04-13 NOTE — OB PROVIDER LABOR PROGRESS NOTE - NS_SUBJECTIVE/OBJECTIVE_OBGYN_ALL_OB_FT
32 y/o  undergoing IOL due to GDMA1 and low Jessica-a. She has no complaints comfortable with epidural.
Patient laboring in room.
Patient reports contractions are feeling stronger but farther apart.

## 2021-04-13 NOTE — OB PROVIDER LABOR PROGRESS NOTE - ASSESSMENT
31 y.o.  at 39 weeks 4 days gestation undergoing IOL for GDMA1 complicated by Low-WATSON-A, on pitocin. Cat 1 FHT.     - continue pitocin induction  - continuous toco and fetal heart monitoring    Discussed with Dr. Roger. 
31 y.o.  at 39 weeks 4 days gestation undergoing IOL for GDMA1, low WATSON-A, on pitocin. Cat 1 FHT.     - continue induction with pitocin  - continuous toco and fetal heart monitoring    Discussed with Dr. Roger. 
Cat 1 tracing   AROM- clear   Pitocin at 10 u

## 2021-04-13 NOTE — OB PROVIDER DELIVERY SUMMARY - NSPROVIDERDELIVERYNOTE_OBGYN_ALL_OB_FT
Vaginal Delivery Summary    Procedure: Normal spontaneous vaginal delivery   Findings: Viable female infant delivered in cephalic presentation at 10:26, placenta delivered at 10:32.  Apgar scores 9/9.   Weight will be recorded after 1 hour to allow for skin-to-skin contact.  Laceration(s): 1st degree perineal  Repair: 2-0 chromic  EBL:108  Complications: nuchal x1    Procedure:   Patient felt rectal pressure and was found to be fully dilated, +2 station. She pushed effectively for 5 minutes. She delivered a viable female infant. A loose nuchal cord X 1 was reduced on delivery of the shoulders and delayed cord clamping was performed for 30 seconds. Placenta delivered intact and pitocin was started at the delivery of infant. Perineum and vagina were inspected, 1st degree laceration present. Adequate hemostasis was obtained.

## 2021-04-14 LAB
HCT VFR BLD CALC: 34.1 % — LOW (ref 34.5–45)
HGB BLD-MCNC: 11.5 G/DL — SIGNIFICANT CHANGE UP (ref 11.5–15.5)

## 2021-04-14 RX ORDER — ACETAMINOPHEN 500 MG
3 TABLET ORAL
Qty: 120 | Refills: 0
Start: 2021-04-14 | End: 2021-04-23

## 2021-04-14 RX ORDER — IBUPROFEN 200 MG
1 TABLET ORAL
Qty: 40 | Refills: 0
Start: 2021-04-14 | End: 2021-04-23

## 2021-04-14 RX ADMIN — Medication 1 TABLET(S): at 13:10

## 2021-04-14 RX ADMIN — Medication 975 MILLIGRAM(S): at 09:34

## 2021-04-14 RX ADMIN — Medication 975 MILLIGRAM(S): at 22:01

## 2021-04-14 RX ADMIN — SODIUM CHLORIDE 3 MILLILITER(S): 9 INJECTION INTRAMUSCULAR; INTRAVENOUS; SUBCUTANEOUS at 05:14

## 2021-04-14 RX ADMIN — Medication 975 MILLIGRAM(S): at 16:59

## 2021-04-14 RX ADMIN — Medication 975 MILLIGRAM(S): at 03:41

## 2021-04-14 RX ADMIN — Medication 600 MILLIGRAM(S): at 14:00

## 2021-04-14 RX ADMIN — Medication 975 MILLIGRAM(S): at 10:15

## 2021-04-14 RX ADMIN — Medication 975 MILLIGRAM(S): at 02:41

## 2021-04-14 RX ADMIN — Medication 600 MILLIGRAM(S): at 13:11

## 2021-04-14 RX ADMIN — Medication 600 MILLIGRAM(S): at 00:55

## 2021-04-14 RX ADMIN — Medication 600 MILLIGRAM(S): at 05:13

## 2021-04-14 RX ADMIN — Medication 975 MILLIGRAM(S): at 23:01

## 2021-04-14 RX ADMIN — MAGNESIUM HYDROXIDE 30 MILLILITER(S): 400 TABLET, CHEWABLE ORAL at 13:10

## 2021-04-14 RX ADMIN — Medication 600 MILLIGRAM(S): at 06:13

## 2021-04-14 NOTE — DISCHARGE NOTE OB - PATIENT PORTAL LINK FT
You can access the FollowMyHealth Patient Portal offered by [facility] by registering at the following website: [email or web address]. By joining [employer]’s FollowMyHealth portal, you will also be able to view your health information using other applications (apps) compatible with our system.

## 2021-04-14 NOTE — DISCHARGE NOTE OB - PLAN OF CARE
Patient should transition to regular activity level. Resume regular diet. Patient should follow up with her OB for a postpartum checkup 3-6 weeks after delivery. Patient should call her doctor sooner if she develops a fever or uncontrolled vaginal bleeding. Please call sooner if there are any other concerns. Rapid recovery

## 2021-04-14 NOTE — DISCHARGE NOTE OB - CARE PLAN
Principal Discharge DX:	 (normal spontaneous vaginal delivery)  Goal:	Rapid recovery  Assessment and plan of treatment:	Patient should transition to regular activity level. Resume regular diet. Patient should follow up with her OB for a postpartum checkup 3-6 weeks after delivery. Patient should call her doctor sooner if she develops a fever or uncontrolled vaginal bleeding. Please call sooner if there are any other concerns.

## 2021-04-14 NOTE — DISCHARGE NOTE OB - CARE PROVIDER_API CALL
Rossana Montoya (DO; MPH)  Obstetrics and Gynecology  [address] [place] [address]  Phone: [phone]  Fax: [phone]  Established Patient  Follow Up Time:

## 2021-04-14 NOTE — DISCHARGE NOTE OB - HOSPITAL COURSE
She is a 32 yo now  who presented at 39 weeks 4 days GA for an induction of labor and had a normal delivery. She had a normal postpartum course and was discharged home in stable condition. Upon discharge she was voiding, tolerating PO, and ambulating.

## 2021-04-14 NOTE — PROGRESS NOTE ADULT - ASSESSMENT
A/P:  LEANDRO HERNANDEZ is a 31y  s/p  PPD# 1 of a viable female infant.   - VSS.   - Pain is well controlled  - Tolerating PO intake  - Normal bowel function  - Bleeding wnl  - Rh +  - Dispo: Home pending attending approval A/P:  LEANDRO HERNANDEZ is a 31y  s/p  PPD# 1 of a viable female infant.   - VSS.   - Pain is well controlled  - Tolerating PO intake  - Normal bowel function  - Bleeding wnl  - Rh +  - Dispo: Home pending attending approval    PGY4 Addendum: Patient seen and examined at bedside. Agree with the above

## 2021-04-14 NOTE — DISCHARGE NOTE OB - MEDICATION SUMMARY - MEDICATIONS TO TAKE
I will START or STAY ON the medications listed below when I get home from the hospital:    acetaminophen 325 mg oral tablet  -- 3 tab(s) by mouth every 6 hours, As Needed -for mild pain MDD:4,000 mg  -- Indication: For pain    ibuprofen 600 mg oral tablet  -- 1 tab(s) by mouth every 6 hours, As Needed -for mild pain   -- Indication: For pain    Prenatal Multivitamins with Folic Acid 1 mg oral tablet  -- 1 tab(s) by mouth once a day  -- Indication: For postpartum recovery    Colace 100 mg oral capsule  -- 1 cap(s) by mouth 2 times a day, As Needed -for constipation   -- Medication should be taken with plenty of water.    -- Indication: For constipation    montelukast  -- orally once a day  -- Indication: For home med

## 2021-04-15 VITALS
TEMPERATURE: 98 F | HEART RATE: 71 BPM | SYSTOLIC BLOOD PRESSURE: 110 MMHG | DIASTOLIC BLOOD PRESSURE: 57 MMHG | RESPIRATION RATE: 18 BRPM | OXYGEN SATURATION: 97 %

## 2021-04-15 PROCEDURE — 85018 HEMOGLOBIN: CPT

## 2021-04-15 PROCEDURE — 85025 COMPLETE CBC W/AUTO DIFF WBC: CPT

## 2021-04-15 PROCEDURE — 86901 BLOOD TYPING SEROLOGIC RH(D): CPT

## 2021-04-15 PROCEDURE — 86850 RBC ANTIBODY SCREEN: CPT

## 2021-04-15 PROCEDURE — 36415 COLL VENOUS BLD VENIPUNCTURE: CPT

## 2021-04-15 PROCEDURE — 86780 TREPONEMA PALLIDUM: CPT

## 2021-04-15 PROCEDURE — 85014 HEMATOCRIT: CPT

## 2021-04-15 PROCEDURE — 86769 SARS-COV-2 COVID-19 ANTIBODY: CPT

## 2021-04-15 PROCEDURE — 80053 COMPREHEN METABOLIC PANEL: CPT

## 2021-04-15 PROCEDURE — 86900 BLOOD TYPING SEROLOGIC ABO: CPT

## 2021-04-15 PROCEDURE — 82962 GLUCOSE BLOOD TEST: CPT

## 2021-04-15 RX ADMIN — Medication 600 MILLIGRAM(S): at 06:16

## 2021-04-15 RX ADMIN — Medication 1 TABLET(S): at 12:23

## 2021-04-15 RX ADMIN — Medication 600 MILLIGRAM(S): at 00:22

## 2021-04-15 RX ADMIN — Medication 975 MILLIGRAM(S): at 10:30

## 2021-04-15 RX ADMIN — Medication 600 MILLIGRAM(S): at 12:20

## 2021-04-15 RX ADMIN — Medication 975 MILLIGRAM(S): at 02:28

## 2021-04-15 RX ADMIN — Medication 600 MILLIGRAM(S): at 01:22

## 2021-04-15 RX ADMIN — Medication 975 MILLIGRAM(S): at 09:48

## 2021-04-15 RX ADMIN — Medication 975 MILLIGRAM(S): at 03:28

## 2021-04-15 RX ADMIN — Medication 600 MILLIGRAM(S): at 05:16

## 2021-04-15 RX ADMIN — Medication 600 MILLIGRAM(S): at 13:15

## 2021-04-15 NOTE — PROGRESS NOTE ADULT - ATTENDING COMMENTS
Patient seen and examined, doing well no complaints  VS and labs reviewed  exam: fundus firm/NT   lower ext: no calf tenderness bilaterally  PPD#1 s/p  -stable, afebrile  obstetrically cleared for discharge, but  undergoing phototherapy  Coleen Roger MD
30 yo  stable on PPD 2. Discharge instructions reviewed.

## 2021-04-15 NOTE — PROGRESS NOTE ADULT - ASSESSMENT
A/P:  LEANDRO HERNANDEZ is a 31y  s/p  PPD# 2 of a viable female infant.   - VSS.   - Pain is well controlled  - Tolerating PO intake  - Normal bowel function  - Bleeding wnl  - Rh +  - Dispo: Home pending attending approval     A/P:  LEANDRO HERNANDEZ is a 31y  s/p  PPD# 2 of a viable female infant.   - VSS.   - Pain is well controlled  - Tolerating PO intake  - Normal bowel function  - Bleeding wnl  - Rh +  - Dispo: Home pending attending approval    PGY4 Addendum: Patient seen and examined at bedside. Agree with the above

## 2021-04-15 NOTE — PROGRESS NOTE ADULT - SUBJECTIVE AND OBJECTIVE BOX
Name: LEANDRO HERNANDEZ  MRN: [id number]  Date Admitted: 21  Location: [facility] 2016 ([facility] 2EST)  Attending: Rossana Montoya Althea      Post Partum: Vaginal Delivery Progress Note    LEANDRO HERNANDEZ is a 31y  s/p  PPD# 2 of a viable female infant.     SUBJECTIVE:  No acute events overnight. Pain is well controlled with PRN pain medication. No problems with ambulating, voiding, or PO intake. Has had flatus and BM. Denies N/V. Patient is having normal lochia which is decreasing.    She is breastfeeding and the baby is latching on.     OBJECTIVE:    Vital Signs Last 24 Hrs  T(C): 36.4 (15 Apr 2021 05:19), Max: 36.4 (15 Apr 2021 05:19)  T(F): 97.5 (15 Apr 2021 05:19), Max: 97.5 (15 Apr 2021 05:19)  HR: 71 (15 Apr 2021 05:19) (71 - 71)  BP: 110/57 (15 Apr 2021 05:19) (110/57 - 110/57)  RR: 18 (15 Apr 2021 05:19) (18 - 18)  SpO2: 97% (15 Apr 2021 05:19) (97% - 97%)      Physical exam:  General: AOx3, NAD.  Heart: RRR. S1S2.  Lungs: CTABL. Good airflow bilaterally.   Abdomen: +BS, Soft, appropriately tender to palpitation, firm uterine fundus at umbilicus.  Pelvic: Lochia normal  Ext: No DVT signs, warm extremities.        LABS:                        13.2   15.76 )-----------( 318      ( 2021 20:57 )             38.8                               11.5   x     )-----------( x        ( 2021 08:27 )             34.1         
Name: LEANDRO HERNANDEZ  MRN: [id number]  Date Admitted: 21  Location: [facility] 2E2016 ([facility] 2EST)  Attending: Rossana Montoya Althea      Post Partum: Vaginal Delivery Progress Note    LEANDRO HERNANDEZ is a 31y  s/p  PPD# 1 of a viable female infant.     SUBJECTIVE:  No acute events overnight. Pain is well controlled with PRN pain medication. No problems with ambulating, voiding, or PO intake. Has had flatus but no BM. Denies N/V. Patient is having normal lochia which is decreasing.    She is breastfeeding and the baby is latching on.     OBJECTIVE:    Vital Signs Last 24 Hrs  T(C): 36.4 (2021 04:35), Max: 36.6 (2021 11:57)  T(F): 97.6 (2021 04:35), Max: 97.9 (2021 11:57)  HR: 68 (2021 04:35) (56 - 109)  BP: 99/58 (2021 04:35) (99/54 - 137/90)  RR: 18 (2021 04:35) (12 - 18)  SpO2: 99% (2021 04:35) (96% - 100%)      Physical exam:  General: AOx3, NAD.  Heart: RRR. S1S2.  Lungs: CTABL. Good airflow bilaterally.   Abdomen: +BS, Soft, appropriately tender to palpitation, firm uterine fundus at umbilicus.  Pelvic: Lochia normal  Ext: No DVT signs, warm extremities.        LABS:                        13.2   15.76 )-----------( 318      ( 2021 20:57 )             38.8

## 2021-04-19 ENCOUNTER — NON-APPOINTMENT (OUTPATIENT)
Age: 31
End: 2021-04-19

## 2021-04-19 ENCOUNTER — EMERGENCY (EMERGENCY)
Facility: HOSPITAL | Age: 31
LOS: 1 days | Discharge: HOME | End: 2021-04-19
Attending: EMERGENCY MEDICINE
Payer: COMMERCIAL

## 2021-04-19 VITALS
WEIGHT: 156.09 LBS | SYSTOLIC BLOOD PRESSURE: 132 MMHG | TEMPERATURE: 98 F | OXYGEN SATURATION: 98 % | RESPIRATION RATE: 16 BRPM | HEART RATE: 91 BPM | HEIGHT: 65 IN | DIASTOLIC BLOOD PRESSURE: 84 MMHG

## 2021-04-19 DIAGNOSIS — Z90.89 ACQUIRED ABSENCE OF OTHER ORGANS: Chronic | ICD-10-CM

## 2021-04-19 DIAGNOSIS — Z98.890 OTHER SPECIFIED POSTPROCEDURAL STATES: Chronic | ICD-10-CM

## 2021-04-19 LAB
ALBUMIN SERPL ELPH-MCNC: 3.7 G/DL — SIGNIFICANT CHANGE UP (ref 3.3–5.2)
ALP SERPL-CCNC: 110 U/L — SIGNIFICANT CHANGE UP (ref 40–120)
ALT FLD-CCNC: 46 U/L — HIGH
ANION GAP SERPL CALC-SCNC: 12 MMOL/L — SIGNIFICANT CHANGE UP (ref 5–17)
APPEARANCE UR: CLEAR — SIGNIFICANT CHANGE UP
AST SERPL-CCNC: 21 U/L — SIGNIFICANT CHANGE UP
BACTERIA # UR AUTO: ABNORMAL
BASOPHILS # BLD AUTO: 0.05 K/UL — SIGNIFICANT CHANGE UP (ref 0–0.2)
BASOPHILS NFR BLD AUTO: 0.5 % — SIGNIFICANT CHANGE UP (ref 0–2)
BILIRUB SERPL-MCNC: <0.2 MG/DL — LOW (ref 0.4–2)
BILIRUB UR-MCNC: NEGATIVE — SIGNIFICANT CHANGE UP
BUN SERPL-MCNC: 12 MG/DL — SIGNIFICANT CHANGE UP (ref 8–20)
CALCIUM SERPL-MCNC: 8.9 MG/DL — SIGNIFICANT CHANGE UP (ref 8.6–10.2)
CHLORIDE SERPL-SCNC: 103 MMOL/L — SIGNIFICANT CHANGE UP (ref 98–107)
CO2 SERPL-SCNC: 25 MMOL/L — SIGNIFICANT CHANGE UP (ref 22–29)
COLOR SPEC: YELLOW — SIGNIFICANT CHANGE UP
CREAT SERPL-MCNC: 0.49 MG/DL — LOW (ref 0.5–1.3)
DIFF PNL FLD: ABNORMAL
EOSINOPHIL # BLD AUTO: 0.34 K/UL — SIGNIFICANT CHANGE UP (ref 0–0.5)
EOSINOPHIL NFR BLD AUTO: 3.5 % — SIGNIFICANT CHANGE UP (ref 0–6)
EPI CELLS # UR: SIGNIFICANT CHANGE UP
GLUCOSE SERPL-MCNC: 83 MG/DL — SIGNIFICANT CHANGE UP (ref 70–99)
GLUCOSE UR QL: NEGATIVE MG/DL — SIGNIFICANT CHANGE UP
HCT VFR BLD CALC: 38.2 % — SIGNIFICANT CHANGE UP (ref 34.5–45)
HGB BLD-MCNC: 12.2 G/DL — SIGNIFICANT CHANGE UP (ref 11.5–15.5)
IMM GRANULOCYTES NFR BLD AUTO: 0.6 % — SIGNIFICANT CHANGE UP (ref 0–1.5)
KETONES UR-MCNC: NEGATIVE — SIGNIFICANT CHANGE UP
LEUKOCYTE ESTERASE UR-ACNC: ABNORMAL
LIDOCAIN IGE QN: 40 U/L — SIGNIFICANT CHANGE UP (ref 22–51)
LYMPHOCYTES # BLD AUTO: 2.43 K/UL — SIGNIFICANT CHANGE UP (ref 1–3.3)
LYMPHOCYTES # BLD AUTO: 25.1 % — SIGNIFICANT CHANGE UP (ref 13–44)
MCHC RBC-ENTMCNC: 31.3 PG — SIGNIFICANT CHANGE UP (ref 27–34)
MCHC RBC-ENTMCNC: 31.9 GM/DL — LOW (ref 32–36)
MCV RBC AUTO: 97.9 FL — SIGNIFICANT CHANGE UP (ref 80–100)
MONOCYTES # BLD AUTO: 0.68 K/UL — SIGNIFICANT CHANGE UP (ref 0–0.9)
MONOCYTES NFR BLD AUTO: 7 % — SIGNIFICANT CHANGE UP (ref 2–14)
NEUTROPHILS # BLD AUTO: 6.13 K/UL — SIGNIFICANT CHANGE UP (ref 1.8–7.4)
NEUTROPHILS NFR BLD AUTO: 63.3 % — SIGNIFICANT CHANGE UP (ref 43–77)
NITRITE UR-MCNC: NEGATIVE — SIGNIFICANT CHANGE UP
NT-PROBNP SERPL-SCNC: 202 PG/ML — SIGNIFICANT CHANGE UP (ref 0–300)
PH UR: 6 — SIGNIFICANT CHANGE UP (ref 5–8)
PLATELET # BLD AUTO: 375 K/UL — SIGNIFICANT CHANGE UP (ref 150–400)
POTASSIUM SERPL-MCNC: 3.9 MMOL/L — SIGNIFICANT CHANGE UP (ref 3.5–5.3)
POTASSIUM SERPL-SCNC: 3.9 MMOL/L — SIGNIFICANT CHANGE UP (ref 3.5–5.3)
PROT SERPL-MCNC: 6.8 G/DL — SIGNIFICANT CHANGE UP (ref 6.6–8.7)
PROT UR-MCNC: NEGATIVE MG/DL — SIGNIFICANT CHANGE UP
RBC # BLD: 3.9 M/UL — SIGNIFICANT CHANGE UP (ref 3.8–5.2)
RBC # FLD: 12.9 % — SIGNIFICANT CHANGE UP (ref 10.3–14.5)
RBC CASTS # UR COMP ASSIST: ABNORMAL /HPF (ref 0–4)
SODIUM SERPL-SCNC: 140 MMOL/L — SIGNIFICANT CHANGE UP (ref 135–145)
SP GR SPEC: 1.01 — SIGNIFICANT CHANGE UP (ref 1.01–1.02)
TROPONIN T SERPL-MCNC: <0.01 NG/ML — SIGNIFICANT CHANGE UP (ref 0–0.06)
UROBILINOGEN FLD QL: NEGATIVE MG/DL — SIGNIFICANT CHANGE UP
WBC # BLD: 9.69 K/UL — SIGNIFICANT CHANGE UP (ref 3.8–10.5)
WBC # FLD AUTO: 9.69 K/UL — SIGNIFICANT CHANGE UP (ref 3.8–10.5)
WBC UR QL: SIGNIFICANT CHANGE UP

## 2021-04-19 PROCEDURE — 83690 ASSAY OF LIPASE: CPT

## 2021-04-19 PROCEDURE — 36415 COLL VENOUS BLD VENIPUNCTURE: CPT

## 2021-04-19 PROCEDURE — 83880 ASSAY OF NATRIURETIC PEPTIDE: CPT

## 2021-04-19 PROCEDURE — 81001 URINALYSIS AUTO W/SCOPE: CPT

## 2021-04-19 PROCEDURE — 76705 ECHO EXAM OF ABDOMEN: CPT | Mod: 26,RT

## 2021-04-19 PROCEDURE — 93005 ELECTROCARDIOGRAM TRACING: CPT

## 2021-04-19 PROCEDURE — 85025 COMPLETE CBC W/AUTO DIFF WBC: CPT

## 2021-04-19 PROCEDURE — 84484 ASSAY OF TROPONIN QUANT: CPT

## 2021-04-19 PROCEDURE — 80053 COMPREHEN METABOLIC PANEL: CPT

## 2021-04-19 PROCEDURE — 93010 ELECTROCARDIOGRAM REPORT: CPT

## 2021-04-19 PROCEDURE — 87086 URINE CULTURE/COLONY COUNT: CPT

## 2021-04-19 PROCEDURE — 99284 EMERGENCY DEPT VISIT MOD MDM: CPT | Mod: 25

## 2021-04-19 PROCEDURE — 99285 EMERGENCY DEPT VISIT HI MDM: CPT

## 2021-04-19 PROCEDURE — 76705 ECHO EXAM OF ABDOMEN: CPT

## 2021-04-19 RX ORDER — LIDOCAINE 4 G/100G
1 CREAM TOPICAL ONCE
Refills: 0 | Status: COMPLETED | OUTPATIENT
Start: 2021-04-19 | End: 2021-04-19

## 2021-04-19 RX ADMIN — LIDOCAINE 1 PATCH: 4 CREAM TOPICAL at 20:56

## 2021-04-19 NOTE — ED STATDOCS - PROGRESS NOTE DETAILS
PT evaluated by intake physician. HPI/PE/ROS as noted above. Will follow up plan per intake physician. Spoke with OB/GYN about pt and Will advise them on results once received. Pt told of results. Will order urine and reassess. Pt cesario dof liver steatosis. Will dc with GI and GYN f/u.

## 2021-04-19 NOTE — ED STATDOCS - NSFOLLOWUPINSTRUCTIONS_ED_ALL_ED_FT
Follow up with Gastroenterology and GYN.  Come back with new or worsening symptoms.  Continue to take medication for pain from GYN.     Abdominal Pain    Many things can cause abdominal pain. Many times, abdominal pain is not caused by a disease and will improve without treatment. Your health care provider will do a physical exam to determine if there is a dangerous cause of your pain; blood tests and imaging may help determine the cause of your pain. However, in many cases, no cause may be found and you may need further testing as an outpatient. Monitor your abdominal pain for any changes.     SEEK IMMEDIATE MEDICAL CARE IF YOU HAVE ANY OF THE FOLLOWING SYMPTOMS: worsening abdominal pain, uncontrollable vomiting, profuse diarrhea, inability to have bowel movements or pass gas, black or bloody stools, fever accompanying chest pain or back pain, or fainting. These symptoms may represent a serious problem that is an emergency. Do not wait to see if the symptoms will go away. Get medical help right away. Call 911 and do not drive yourself to the hospital.     Back Pain    Back pain is very common in adults. The cause of back pain is rarely dangerous and the pain often gets better over time. The cause of your back pain may not be known and may include strain of muscles or ligaments, degeneration of the spinal disks, or arthritis. Occasionally the pain may radiate down your leg(s). Over-the-counter medicines to reduce pain and inflammation are often the most helpful. Stretching and remaining active frequently helps the healing process.     SEEK IMMEDIATE MEDICAL CARE IF YOU HAVE ANY OF THE FOLLOWING SYMPTOMS: bowel or bladder control problems, unusual weakness or numbness in your arms or legs, nausea or vomiting, abdominal pain, fever, dizziness/lightheadedness.

## 2021-04-19 NOTE — ED STATDOCS - CLINICAL SUMMARY MEDICAL DECISION MAKING FREE TEXT BOX
Pt presenting with mid back pain and RUQ pain x1 week since giving birth. No CP or trouble breathing despite triage note. Denies LE pain or swelling, denies changes in appetite. (+) TTP RUQ on exam. Will obtains labs, EKG, RUQ sono, consult pt's OB. Likely discharge if normal workup.

## 2021-04-19 NOTE — ED STATDOCS - CARE PROVIDER_API CALL
Nadege Roberts (DO)  Gastroenterology  [address], [address]  [place] [address]  Phone: [phone]  Fax: [phone]  Follow Up Time:

## 2021-04-19 NOTE — ED STATDOCS - OBJECTIVE STATEMENT
32 y/o F pt with significant PMHx of gestational DM presents to the ED c/o constant upper abd pain that wraps around to her mid back s/p giving birth about 1 week ago. Pain is worse with breathing. Not worse with food intake. Her labor was uncomplicated. Her first birth was 1 year ago. She had an epidural done. Dr. Alfaro sent her to the ED for evaluation. Denies trauma. Denies fevers, coughing, CP, SOB, sick contacts, FHx of heart disease. NKDA.

## 2021-04-19 NOTE — ED ADULT TRIAGE NOTE - CHIEF COMPLAINT QUOTE
Patient A&Ox4 complaining of back pain, chest pain & abdominal pain x6 days. Stated had vaginal delivery last Tuesday, negative complications.

## 2021-04-19 NOTE — ED STATDOCS - PATIENT PORTAL LINK FT
You can access the FollowMyHealth Patient Portal offered by [facility] by registering at the following website: [email or web address]. By joining [employer]’s FollowMyHealth portal, you will also be able to view your health information using other applications (apps) compatible with our system.

## 2021-04-19 NOTE — ED ADULT NURSE NOTE - OBJECTIVE STATEMENT
32 y/o c/o upper mid back pain from epidural, patient reports just had a baby couple days ago, also states having RUQ abdominal pain. Denies fever, nausea, vomit.

## 2021-04-21 LAB
CULTURE RESULTS: SIGNIFICANT CHANGE UP
SPECIMEN SOURCE: SIGNIFICANT CHANGE UP

## 2021-04-22 ENCOUNTER — APPOINTMENT (OUTPATIENT)
Dept: OBGYN | Facility: CLINIC | Age: 31
End: 2021-04-22
Payer: COMMERCIAL

## 2021-04-22 VITALS
TEMPERATURE: 97.4 F | BODY MASS INDEX: 27.49 KG/M2 | WEIGHT: 165 LBS | HEIGHT: 65 IN | DIASTOLIC BLOOD PRESSURE: 62 MMHG | SYSTOLIC BLOOD PRESSURE: 118 MMHG

## 2021-04-22 PROCEDURE — 99072 ADDL SUPL MATRL&STAF TM PHE: CPT

## 2021-04-22 PROCEDURE — 99212 OFFICE O/P EST SF 10 MIN: CPT | Mod: 24

## 2021-04-25 NOTE — HISTORY OF PRESENT ILLNESS
[Postpartum Follow Up] : postpartum follow up [Last Pap Date: ___] : Last Pap Date: [unfilled] [Delivery Date: ___] : on [unfilled] [] : delivered by vaginal delivery [Female] : Delivery History: baby girl [Wt. ___] : weighing [unfilled] [Breastfeeding] : currently nursing [Doing Well] : is doing well [No Sign of Infection] : is showing no signs of infection [Excellent Pain Control] : has excellent pain control [None] : None [Currently Experiencing] : The patient is currently experiencing symptoms. [Back Pain] : back pain [Complications:___] : no complications [BF with Difficulty] : nursing without difficulty [Resumed Menses] : has not resumed her menses [Resumed [place]] : has not resumed intercourse [S/Sx PP Depression] : no signs/symptoms of postpartum depression [Suicidal Ideation] : no suicidal ideation [Erythema] : not erythematous [Cervix Sample Taken] : cervical sample not taken for a Pap smear [FreeTextEntry8] : s/p  on 21 [de-identified] : GEN: NAD Fundus: Firm

## 2021-04-25 NOTE — END OF VISIT
[FreeTextEntry3] : I, Margi Hicks, solely acted as scribe for Dr. Rossana Montoya on 04/22/2021.\par All medical entries made by the Scribe were at my, Dr. Montoya's direction and personally dictated by me on 04/22/2021. I have reviewed the chart and agree that the record accurately reflects my personal performance of the history, physical exam, assessment and plan. I have also personally directed, reviewed, and agreed with the chart.

## 2021-05-28 ENCOUNTER — APPOINTMENT (OUTPATIENT)
Dept: OBGYN | Facility: CLINIC | Age: 31
End: 2021-05-28

## 2021-05-28 VITALS
HEIGHT: 65 IN | SYSTOLIC BLOOD PRESSURE: 100 MMHG | BODY MASS INDEX: 27.16 KG/M2 | WEIGHT: 163 LBS | DIASTOLIC BLOOD PRESSURE: 60 MMHG | TEMPERATURE: 97 F

## 2021-05-28 RX ORDER — LANCETS 28 GAUGE
EACH MISCELLANEOUS
Qty: 1 | Refills: 3 | Status: DISCONTINUED | COMMUNITY
Start: 2021-01-26 | End: 2021-05-28

## 2021-05-28 RX ORDER — MULTIVIT-MIN/FOLIC/VIT K/LYCOP 400-300MCG
28-0.8 TABLET ORAL DAILY
Refills: 0 | Status: DISCONTINUED | COMMUNITY
Start: 2018-12-13 | End: 2021-05-28

## 2021-05-28 RX ORDER — ASPIRIN ENTERIC COATED TABLETS 81 MG 81 MG/1
81 TABLET, DELAYED RELEASE ORAL DAILY
Refills: 0 | Status: DISCONTINUED | COMMUNITY
Start: 2020-10-08 | End: 2021-05-28

## 2021-05-28 RX ORDER — BLOOD SUGAR DIAGNOSTIC
STRIP MISCELLANEOUS 4 TIMES DAILY
Qty: 100 | Refills: 1 | Status: DISCONTINUED | COMMUNITY
Start: 2021-01-26 | End: 2021-05-28

## 2021-05-28 NOTE — HISTORY OF PRESENT ILLNESS
[Patient reported PAP Smear was normal] : Patient reported PAP Smear was normal [Gonorrhea test offered] : Gonorrhea test offered [Chlamydia test offered] : Chlamydia test offered [HPV test offered] : HPV test offered [Y] : Positive pregnancy history [N] : Patient is not sexually active [Menarche Age: ____] : age at menarche was [unfilled] [Previously active] : previously active [Men] : men [Vaginal] : vaginal [No] : No [Patient refuses STI testing] : Patient refuses STI testing [TextBox_4] : PATIENT PRESENTS FOR 6 WEEKS  POST PARTUM FOLLOW UP.\par \par PATIENT DELIVERED 2021  GIRL "ADLEY" 8 LBS AND 4 ONZ FORMULA. [Papeardate] : 08/13/20 [TextBox_31] : NEG [GonorrheaDate] : 02/26/21 [TextBox_63] : NEG [ChlamydiaDate] : 02/26/21 [TextBox_68] : NEG [HPVDate] : 08/13/20 [TextBox_78] : NEG [LMPDate] : 07/10/20 [PGHxTotal] : 2 [[facility]] : 2 [[facility] : [facility] [FreeTextEntry1] : 07/10/20

## 2021-05-28 NOTE — PHYSICAL EXAM
[Appropriately responsive] : appropriately responsive [Alert] : alert [No Acute Distress] : no acute distress [Soft] : soft [Non-tender] : non-tender [Non-distended] : non-distended [No HSM] : No HSM [No Lesions] : no lesions [No Mass] : no mass [Oriented x3] : oriented x3 [Examination Of The Breasts] : a normal appearance [No Masses] : no breast masses were palpable [Labia Majora] : normal [Labia Minora] : normal [Discharge] : a  ~M vaginal discharge was present [Scant] : scant [White] : white [Normal] : normal [Uterine Adnexae] : normal [FreeTextEntry4] : Scant white- mucous discharge

## 2021-05-28 NOTE — END OF VISIT
[FreeTextEntry3] : I, Rosio Gregg solely acted as a scribe for Dr. Kelly Caraballo on 05/28/2021 . All medical entries made by the scribe were at my, Dr. Caraballo's, direction and personally dictated by me on 05/28/2021 . I have reviewed the chart and agree that the record accurately reflects my personal performance of the history, physical exam, assessment and plan. I have also personally directed, reviewed, and agreed with the chart.

## 2021-05-28 NOTE — DISCUSSION/SUMMARY
[FreeTextEntry1] : -Hx of gestational diabetes. Rx for Glucose Tolerance ordered. List of laboratories provided. Patient advised to call the office for results if she does not receive a call. \par \par -Vaginal exam revealed white- mucous vaginal discharge. Affirm culture done. \par \par -She will RTO in 08/2021 for annual gyn exam or PRN. \par \par All questions answered.

## 2021-06-15 ENCOUNTER — NON-APPOINTMENT (OUTPATIENT)
Age: 31
End: 2021-06-15

## 2021-06-23 LAB
GLUCOSE 1H P 100 G GLC PO SERPL-MCNC: 154 MG/DL
GLUCOSE 2H P 100 G GLC PO SERPL-MCNC: 141 MG/DL
GLUCOSE BS SERPL-MCNC: 80 MG/DL

## 2021-09-01 NOTE — OB RN PATIENT PROFILE - NS_MODEOFARRIVAL_OBGYN_ALL_OB
[facility] RHEUMATOLOGY FOLLOW UP NOTE       Date Of Service: 9/1/2021  Provider: JOVAN Hopper - CNP    Name: Geronimo Esposito   MRN: [id number]    Chief Complaint(s)     Chief Complaint   Patient presents with    3 Month Follow-Up        History of Present Illness (HPI)     Geronimo Esposito  is a(n)54 y.o. female with a hx of HTN, HLD, cervicalgia, fibromyalgia, +BRANDON, osteoarthritis, impingement syndrome of left shoulder here for the f/u evaluation of + BRANDON, fibromyalgia     Interval hx:    - improvement in dose increase of methotrexate     pain affecting the fingers, wrists, elbows, left shoulder, hips, knees, toes, neck  Pain on a scale 0-10: 7/10  Type of pain: constant  Timing:mornings  Aggravating factors: increased use, activity, cold exposure  Alleviating factors: naproxen, gabapentin    Associated symptoms:  denies swelling/  Redness/ warmth, + AM stiffness lasting about 20 mins      REVIEW OF SYSTEMS: (ROS)    Review of Systems   Constitutional: Negative for fatigue, fever and unexpected weight change. HENT: Positive for tinnitus. Negative for congestion and trouble swallowing. Eyes: Negative for pain and itching. Respiratory: Negative for cough and shortness of breath. Cardiovascular: Negative for chest pain and leg swelling. Gastrointestinal: Negative for abdominal pain, constipation, diarrhea and nausea. Endocrine: Negative for cold intolerance and heat intolerance. Genitourinary: Negative for difficulty urinating, frequency and urgency. Musculoskeletal: Positive for arthralgias, myalgias and neck pain. Negative for back pain and joint swelling. Skin: Negative for rash. Neurological: Negative for dizziness, weakness, numbness and headaches. Psychiatric/Behavioral: The patient is nervous/anxious. PAST MEDICAL HISTORY    History reviewed. No pertinent past medical history.     PAST SURGICAL HISTORY      Past Surgical History:   Procedure Laterality Date    HYSTERECTOMY, TOTAL ABDOMINAL  2006    bleeding, endomedtriosis       FAMILY HISTORY      Family History   Problem Relation Age of Onset    Heart Disease Father        SOCIAL HISTORY      Social History     Tobacco History     Smoking Status  Never Smoker    Smokeless Tobacco Use  Never Used          Alcohol History     Alcohol Use Status  Never          Drug Use     Drug Use Status  Never          Sexual Activity     Sexually Active  Not Asked                ALLERGIES     Allergies   Allergen Reactions    Latex     Asa [Aspirin]      Restless itchy    Codeine      shakes       CURRENT MEDICATIONS      Current Outpatient Medications   Medication Sig Dispense Refill    methotrexate (RHEUMATREX) 2.5 MG chemo tablet Take 8 tablets by mouth once a week Take 4 tablets in the morning and 4 tablets in the evening once weekly 32 tablet 1    folic acid (FOLVITE) 1 MG tablet Take 1 tablet by mouth daily 30 tablet 3    gabapentin (NEURONTIN) 400 MG capsule Take 1 capsule by mouth 3 times daily for 180 days. 90 capsule 5    amLODIPine (NORVASC) 2.5 MG tablet Take 1 tablet by mouth daily 30 tablet 3    venlafaxine (EFFEXOR XR) 75 MG extended release capsule Take 150 mg by mouth daily       estradiol (ESTRACE) 2 MG tablet Take 2 mg by mouth daily      bisoprolol (ZEBETA) 5 MG tablet Take 5 mg by mouth daily      Multiple Vitamins-Minerals (THERAPEUTIC MULTIVITAMIN-MINERALS) tablet Take 1 tablet by mouth daily      naproxen (NAPROSYN) 250 MG tablet Take 250 mg by mouth 3 times daily as needed for Pain       No current facility-administered medications for this visit. PHYSICAL EXAMINATION / OBJECTIVE   Objective:  /78 (Site: Left Upper Arm, Position: Sitting, Cuff Size: Medium Adult)   Pulse 54   Ht 5' 4.25\" (1.632 m)   Wt 130 lb 3.2 oz (59.1 kg)   SpO2 90%   BMI 22.17 kg/m²     Physical Exam  Vitals reviewed. Constitutional:       Appearance: She is well-developed.    Cardiovascular: Rate and Rhythm: Normal rate and regular rhythm. Pulmonary:      Effort: Pulmonary effort is normal.      Breath sounds: Normal breath sounds. Abdominal:      Palpations: Abdomen is soft. Tenderness: There is no abdominal tenderness. Musculoskeletal:      Cervical back: Normal range of motion and neck supple. Skin:     General: Skin is warm and dry. Findings: No rash. Neurological:      Mental Status: She is alert and oriented to person, place, and time. Deep Tendon Reflexes: Reflexes are normal and symmetric. Psychiatric:         Thought Content:  Thought content normal.       Upper extremities:    SHOULDERS tender bilat, no swelling ,   ELBOWS nontender, no swelling  WRISTS tender bilat, no swelling  HANDS/FINGERS   Tender right MCPs and PIPs, no swelling    + finkelstein testing bilat    Lower extremities:  HIPS tender bilat outer hips  KNEES nontender, no swelling  ANKLES nontender, no swelling   FEET : + MTP compression bilat and midfoot tenderness      RAPID 3:   9/1/2021 --- RAPID 3: 1 + 6 + 5.5 = 12.5     Remission: <3  Low Disease Activity: <6  Moderate Disease Activity: >=6 and <=12  High Disease Activity: >12     LABS    CBC  Lab Results   Component Value Date    WBC 5.1 08/31/2021    WBC 4.5 12/21/2020    RBC 3.89 08/31/2021    HGB 12.4 08/31/2021    HCT 37.0 08/31/2021    MCV 95 08/31/2021    MCH 31.9 08/31/2021    MCHC 33.5 08/31/2021    RDW 14.4 08/31/2021     08/31/2021     12/21/2020       CMP  Lab Results   Component Value Date    CALCIUM 9.1 08/31/2021    LABALBU 4.2 08/31/2021    PROT 6.9 08/31/2021     08/31/2021    K 3.9 08/31/2021    CO2 28 08/31/2021     08/31/2021    BUN 10 08/31/2021    CREATININE 0.66 08/31/2021    ALKPHOS 61 08/31/2021    ALKPHOS 60 12/21/2020    ALT 21 08/31/2021    AST 27 08/31/2021       HgBA1c: No components found for: HGBA1C    No results found for: VITD25      No results found for: ANASCRN  No results found for: SSA  No results found for: SSB  No results found for: ANTI-SMITH  No results found for: DSDNAAB   No results found for: ANTIRNP  No results found for: C3, C4  No results found for: CCPAB  No results found for: RF    No components found for: CANCASCRN, APANCASCRN  Lab Results   Component Value Date    SEDRATE 9 08/31/2021     Lab Results   Component Value Date    CRP 0.4 08/31/2021       RADIOLOGY:         ASSESSMENT/PLAN    Assessment   Plan     Undifferentiated connective tissue disease  - + BRANDON, strong positive SSA, polyarthralgia,  type hand changes, raynauds   - methotrexate 15 mg PO weekly   - folic acid 1 mg daily    Raynauds phenomenon  - + color changes of hands. + nail capillary changes. - continue conservative measures    Fibromyalgia   - continue gabapentin 400 mg TID    Lateral epicondylitis- resolved    DeQuervains tenosynovitis   - discussed wrist splinting    Medication monitoring   - CBC, CMP, sed rate, CRP q 8 weeks    No follow-ups on file. Electronically signed by JOVAN Moon - CNP on 9/1/2021 at 11:41 AM    New Prescriptions    No medications on file       Thank you for allowing me to participate in the care of this patient. Please call if there are any questions. Car

## 2021-09-08 NOTE — OB RN DELIVERY SUMMARY - NS_DELIVERYATTENDING1_OBGYN_ALL_OB_FT
----- Message from Aurea Sherman LPN sent at 9/8/2021  3:28 PM CDT -----    ----- Message -----  From: Padmaja Antonio MD  Sent: 9/8/2021  11:27 AM CDT  To: MATILDA Anotnio  Nurse Msg Pool    Ultrasound shows evidence of fatty liver.  This is also called Non-Alcoholic SteatoHepatitis or SCHWARZ.  The body attempts to put more fat into the liver which is encased in a firm capsule.  This can cause discomfort, elevated liver function tests, and eventually lead to cirrhosis.  Treatment involves staying away from alcohol, weight loss, and sometimes taking fish oil supplements can help.    
Left message for patient to return our calol.  
Patient returned call.  Provided patient with results of recent ultrasound.  Discussed treatment as instructed in note by Dr. Perez.    Patient has no further questions at this time.  
Kayleigh Hoffmann MD

## 2021-10-18 ENCOUNTER — APPOINTMENT (OUTPATIENT)
Dept: OBGYN | Facility: CLINIC | Age: 31
End: 2021-10-18
Payer: COMMERCIAL

## 2021-10-18 ENCOUNTER — NON-APPOINTMENT (OUTPATIENT)
Age: 31
End: 2021-10-18

## 2021-10-18 VITALS
BODY MASS INDEX: 28.57 KG/M2 | WEIGHT: 171.5 LBS | DIASTOLIC BLOOD PRESSURE: 70 MMHG | HEIGHT: 65 IN | SYSTOLIC BLOOD PRESSURE: 110 MMHG

## 2021-10-18 DIAGNOSIS — Z01.419 ENCOUNTER FOR GYNECOLOGICAL EXAMINATION (GENERAL) (ROUTINE) W/OUT ABNORMAL FINDINGS: ICD-10-CM

## 2021-10-18 PROCEDURE — 99395 PREV VISIT EST AGE 18-39: CPT

## 2021-10-18 NOTE — HISTORY OF PRESENT ILLNESS
[Patient reported PAP Smear was normal] : Patient reported PAP Smear was normal [Gonorrhea test offered] : Gonorrhea test offered [Chlamydia test offered] : Chlamydia test offered [HPV test offered] : HPV test offered [N] : Patient does not use contraception [Monogamous (Male Partner)] : is monogamous with a male partner [Y] : Positive pregnancy history [Menarche Age: ____] : age at menarche was [unfilled] [Currently Active] : currently active [Men] : men [Vaginal] : vaginal [No] : No [Patient refuses STI testing] : Patient refuses STI testing [Papeardate] : 08/13/20 [TextBox_31] : NEG [GonorrheaDate] : 02/26/21 [TextBox_63] : NEG [ChlamydiaDate] : 02/26/21 [TextBox_68] : NEG [HPVDate] : 08/13/20 [TextBox_78] : NEG [LMPDate] : 10/09/21 [PGHxTotal] : 2 [[facility] [[facility] [FreeTextEntry1] : 10.09.21 No pertinent past medical history

## 2021-10-18 NOTE — END OF VISIT
[FreeTextEntry3] : I, Kesha leiva acted as scribe for Dr. Kelly Caraballo on 10/18/2021. All medical entries made by the Scribe were at my, Dr. Caraballo's, direction and personally dictated by me on 10/18/2021. I have reviewed the chart and agree that the record accurately reflects my personal performance of the history, physical exam, assessment, and plan. I have also personally directed, reviewed, and agreed with the chart.

## 2021-10-18 NOTE — PROCEDURE
FMLA or Disability Forms were received and faxed to the Forms Completion Department today at [phone].  (Please include all appropriate authorization forms with your fax)    Did you have the patient complete (in full) and sign the \"Authorization for Disclosure of Health Information Forms Completion\" form?  YES ________  (If no, please give reason)    Have you communicated that the Forms Completion Process may take up to 14 days?  YES _________    If you have questions about this encounter, please contact the Forms Completion Dept. at [phone].       [Cervical Pap Smear] : cervical Pap smear [Liquid Base] : liquid base [Tolerated Well] : the patient tolerated the procedure well [No Complications] : there were no complications

## 2021-10-18 NOTE — PHYSICAL EXAM
[Appropriately responsive] : appropriately responsive [Alert] : alert [No Acute Distress] : no acute distress [Soft] : soft [Non-tender] : non-tender [Non-distended] : non-distended [No Mass] : no mass [Oriented x3] : oriented x3 [Examination Of The Breasts] : a normal appearance [No Discharge] : no discharge [No Masses] : no breast masses were palpable [Labia Majora] : normal [Labia Minora] : normal [No Bleeding] : There was no active vaginal bleeding [Normal] : normal [Uterine Adnexae] : normal

## 2021-10-18 NOTE — DISCUSSION/SUMMARY
[FreeTextEntry1] : -Annual well woman visit done today. Pap collected. Patient declines STI testing.\par \par -Patient is sexually active. She declines contraception.\par \par -h/o GDMA in her prior pregnancy- She had a postpartum 2 hr GTT and had a mildly elevated 2 hr glucose. She was referred to follow up with Endocrinology, however has not followed up yet due to an issue with her appointment. She agrees to re-schedule her appointment with Endocrine soon.\par \par -Father with h/o BRCA 2 mutation- Patient states that she previously tested negative for BRCA2 (no records). She has not breast complaints. Benign breast exam today. \par \par -She was advised to take vitamin D, calcium, and continue exercises. \par \par -Follow up in 1 year for annual GYN exam or PRN.\par \par All questions and concerns were discussed.

## 2021-10-22 LAB
CYTOLOGY CVX/VAG DOC THIN PREP: NORMAL
HPV HIGH+LOW RISK DNA PNL CVX: NOT DETECTED

## 2022-05-03 NOTE — REASON FOR VISIT
Addended by: YOMAIRA KEENAN on: 5/3/2022 01:20 PM     Modules accepted: Level of Service     [Annual] : an annual visit.

## 2022-07-05 ENCOUNTER — APPOINTMENT (OUTPATIENT)
Dept: OBGYN | Facility: CLINIC | Age: 32
End: 2022-07-05

## 2022-07-05 VITALS
SYSTOLIC BLOOD PRESSURE: 118 MMHG | WEIGHT: 156 LBS | HEIGHT: 65 IN | BODY MASS INDEX: 25.99 KG/M2 | DIASTOLIC BLOOD PRESSURE: 60 MMHG

## 2022-07-05 DIAGNOSIS — O09.899 ABNORMAL HEMATOLOGICAL FINDING ON ANTENATAL SCREENING OF MOTHER: ICD-10-CM

## 2022-07-05 DIAGNOSIS — Z87.42 PERSONAL HISTORY OF OTHER DISEASES OF THE FEMALE GENITAL TRACT: ICD-10-CM

## 2022-07-05 DIAGNOSIS — O28.0 ABNORMAL HEMATOLOGICAL FINDING ON ANTENATAL SCREENING OF MOTHER: ICD-10-CM

## 2022-07-05 DIAGNOSIS — Z86.32 PERSONAL HISTORY OF GESTATIONAL DIABETES: ICD-10-CM

## 2022-07-05 DIAGNOSIS — O24.410 GESTATIONAL DIABETES MELLITUS IN PREGNANCY, DIET CONTROLLED: ICD-10-CM

## 2022-07-05 DIAGNOSIS — Z34.93 ENCOUNTER FOR SUPERVISION OF NORMAL PREGNANCY, UNSPECIFIED, THIRD TRIMESTER: ICD-10-CM

## 2022-07-05 DIAGNOSIS — Z23 ENCOUNTER FOR IMMUNIZATION: ICD-10-CM

## 2022-07-05 DIAGNOSIS — Z13.32 ENCOUNTER FOR SCREENING FOR MATERNAL DEPRESSION: ICD-10-CM

## 2022-07-05 DIAGNOSIS — N61.1 ABSCESS OF THE BREAST AND NIPPLE: ICD-10-CM

## 2022-07-05 DIAGNOSIS — Z34.92 ENCOUNTER FOR SUPERVISION OF NORMAL PREGNANCY, UNSPECIFIED, SECOND TRIMESTER: ICD-10-CM

## 2022-07-05 DIAGNOSIS — R73.09 OTHER ABNORMAL GLUCOSE: ICD-10-CM

## 2022-07-05 DIAGNOSIS — Z01.818 ENCOUNTER FOR OTHER PREPROCEDURAL EXAMINATION: ICD-10-CM

## 2022-07-05 DIAGNOSIS — N89.8 OTHER SPECIFIED NONINFLAMMATORY DISORDERS OF VAGINA: ICD-10-CM

## 2022-07-05 PROCEDURE — 99213 OFFICE O/P EST LOW 20 MIN: CPT

## 2022-07-05 RX ORDER — MONTELUKAST 10 MG/1
10 TABLET, FILM COATED ORAL DAILY
Refills: 0 | Status: DISCONTINUED | COMMUNITY
Start: 2018-12-13 | End: 2022-07-05

## 2022-07-05 RX ORDER — FLUTICASONE PROPIONATE 50 UG/1
50 SPRAY, METERED NASAL
Refills: 0 | Status: DISCONTINUED | COMMUNITY
End: 2022-07-05

## 2022-07-05 NOTE — PHYSICAL EXAM
[Examination Of The Breasts] : a normal appearance [Normal] : normal [No Discharge] : no discharge [No Masses] : no breast masses were palpable [Appropriately responsive] : appropriately responsive [Alert] : alert [No Acute Distress] : no acute distress [Oriented x3] : oriented x3

## 2022-07-11 NOTE — HISTORY OF PRESENT ILLNESS
[N] : Patient does not use contraception [Y] : Positive pregnancy history [Menarche Age: ____] : age at menarche was [unfilled] [No] : Patient does not have concerns regarding sex [Currently Active] : currently active [PapSmeardate] : 10/18/21 [TextBox_31] : NEG [GonorrheaDate] : 02/26/21 [TextBox_63] : NEG [ChlamydiaDate] : 02/26/21 [TextBox_68] : NEG [HPVDate] : 10/18/21 [TextBox_78] : NEG [LMPDate] : 07/05/22 [PGHxTotal] : 2 [[facility]] : 2 [[facility]] : 2 [FreeTextEntry1] : 07/05/22

## 2022-07-11 NOTE — END OF VISIT
[FreeTextEntry3] : I, Andres Kunz solely acted as scribe for Dr. Asha Marie on 07/05/2022 \par All medical entries made by the Scribe were at my, Dr. Marie’s, direction and personally\par dictated by me on 07/05/2022 . I have reviewed the chart and agree that the record\par accurately reflects my personal performance of the history, physical exam, assessment and plan. I\par have also personally directed, reviewed, and agreed with the chart.

## 2022-07-11 NOTE — DISCUSSION/SUMMARY
[FreeTextEntry1] : Blood filled cyst noted superficially on the left areola. Blood expressed. No lumps noted under cyst. \par \par Patient aware to return to office in one week if she is not 100% better. \par \par She will follow up annually or as needed.  Contraindicated

## 2022-07-15 ENCOUNTER — APPOINTMENT (OUTPATIENT)
Dept: OBGYN | Facility: CLINIC | Age: 32
End: 2022-07-15

## 2022-11-22 NOTE — OB RN PATIENT PROFILE - SUPPORT PERSON PHONE
INTERNAL MEDICINE FOLLOW-UP OFFICE VISIT  [facility]    NAME: Melody Calles  AGE: 79 y o  SEX: female  : 1936     DATE: 2022     Assessment and Plan:     Problem List Items Addressed This Visit        Digestive    Dry mouth - Primary     · Failed preventive/supportive measures  · She saw dentist and ENT and reports no improvement with their measures  · She was also treated for thrush with no improvement  · Will try pilocarpine solution but as oral route  · Check Sjogren antibodies   · Continue with supportive measures including well hydration , rinsing mouth 4 times daily,and sugar free lozenges  Hand out give to her          Relevant Medications    pilocarpine (ISOPTO CARPINE) 4 % ophthalmic solution    Other Relevant Orders    Sjogren's Antibodies       Return for Next scheduled follow up  Chief Complaint:     Chief Complaint   Patient presents with   • dry mouth        History of Present Illness:     79year old female patient with dry mouth and slight eye dryness as well  She reports no improvement with all OTC regimens  She saw ENT and dentist with no clinical improvement despite trying their recommendations  No fever, rash , arthralgia or other complaints    The following portions of the patient's history were reviewed and updated as appropriate: allergies, current medications, past family history, past medical history, past social history, past surgical history and problem list      Review of Systems:     Review of Systems   Constitutional: Negative for chills and fever  HENT: Negative for dental problem, ear pain and sore throat  Eyes: Negative for pain and visual disturbance  Respiratory: Negative for cough and shortness of breath  Cardiovascular: Negative for chest pain and palpitations  Gastrointestinal: Negative for abdominal pain and vomiting  Genitourinary: Negative for dysuria and hematuria     Musculoskeletal: Negative for arthralgias and back pain  Skin: Negative for color change and rash  Neurological: Negative for seizures and syncope  All other systems reviewed and are negative  Problem List:     Patient Active Problem List   Diagnosis   • Hypercholesteremia   • Pes anserinus bursitis of right knee   • Nonrheumatic aortic valve insufficiency   • Perforation of left tympanic membrane   • Bilateral impacted cerumen   • Cough   • Breast cancer (HCC)   • Dry mouth   • Chronic maxillary sinusitis   • Electrolyte abnormality   • Chronic fatigue   • Abnormal laboratory test   • Vaginal discharge   • Venous insufficiency   • Encounter for screening mammogram for breast cancer   • Stage 3 chronic kidney disease, unspecified whether stage 3a or 3b CKD ([facility] [address] )   • Black stool   • Esophageal dysphagia   • Age-related osteoporosis without current pathological fracture        Objective:     /96   Pulse 94   Ht 4' 8" (1 422 m)   Wt 54 1 kg (119 lb 3 2 oz)   SpO2 98%   BMI 26 72 kg/m²     Physical Exam  Vitals and nursing note reviewed  Constitutional:       General: She is not in acute distress  Appearance: She is well-developed  HENT:      Head: Normocephalic and atraumatic  Mouth/Throat:      Mouth: Mucous membranes are dry  Eyes:      Conjunctiva/sclera: Conjunctivae normal    Cardiovascular:      Rate and Rhythm: Normal rate and regular rhythm  Heart sounds: No murmur heard  Pulmonary:      Effort: Pulmonary effort is normal  No respiratory distress  Breath sounds: Normal breath sounds  Abdominal:      Palpations: Abdomen is soft  Tenderness: There is no abdominal tenderness  Musculoskeletal:         General: No swelling  Cervical back: Neck supple  Skin:     General: Skin is warm and dry  Capillary Refill: Capillary refill takes less than 2 seconds  Neurological:      Mental Status: She is alert     Psychiatric:         Mood and Affect: Mood normal  Pertinent Laboratory/Diagnostic Studies:    Laboratory Results: I have personally reviewed the pertinent laboratory results/reports     Chemistry Profile:   Results from Last 12 Months   Lab Units 04/22/22  0850   POTASSIUM mmol/L 4 2   CHLORIDE mmol/L 99*   CO2 mmol/L 28   BUN mg/dL 11   CREATININE mg/dL 0 95   GLUCOSE FASTING mg/dL 93   CALCIUM mg/dL 8 5   AST U/L 24   ALT U/L 25   ALK PHOS U/L 82   EGFR ml/min/1 73sq m 54       Radiology/Other Diagnostic Testing Results: I have personally reviewed pertinent reports        Keith Orta MD  [facility] [address] [phone]

## 2023-08-16 ENCOUNTER — APPOINTMENT (OUTPATIENT)
Dept: OBGYN | Facility: CLINIC | Age: 33
End: 2023-08-16

## 2023-08-30 ENCOUNTER — APPOINTMENT (OUTPATIENT)
Dept: OBGYN | Facility: CLINIC | Age: 33
End: 2023-08-30
Payer: COMMERCIAL

## 2023-08-30 ENCOUNTER — NON-APPOINTMENT (OUTPATIENT)
Age: 33
End: 2023-08-30

## 2023-08-30 VITALS
BODY MASS INDEX: 26.16 KG/M2 | HEIGHT: 65 IN | WEIGHT: 157 LBS | DIASTOLIC BLOOD PRESSURE: 60 MMHG | SYSTOLIC BLOOD PRESSURE: 120 MMHG

## 2023-08-30 DIAGNOSIS — Z01.419 ENCOUNTER FOR GYNECOLOGICAL EXAMINATION (GENERAL) (ROUTINE) W/OUT ABNORMAL FINDINGS: ICD-10-CM

## 2023-08-30 DIAGNOSIS — Z12.4 ENCOUNTER FOR SCREENING FOR MALIGNANT NEOPLASM OF CERVIX: ICD-10-CM

## 2023-08-30 DIAGNOSIS — N92.6 IRREGULAR MENSTRUATION, UNSPECIFIED: ICD-10-CM

## 2023-08-30 PROCEDURE — 99395 PREV VISIT EST AGE 18-39: CPT

## 2023-08-30 PROCEDURE — 81003 URINALYSIS AUTO W/O SCOPE: CPT | Mod: QW

## 2023-08-30 RX ORDER — AMOXICILLIN AND CLAVULANATE POTASSIUM 500; 125 MG/1; MG/1
500-125 TABLET, FILM COATED ORAL
Qty: 14 | Refills: 0 | Status: DISCONTINUED | COMMUNITY
Start: 2022-07-05 | End: 2023-08-30

## 2023-08-30 NOTE — PLAN
[FreeTextEntry1] : -F/u pap -Monitor menses; if continued concern over next 2-3 cycles, return for further management  -Baseline mammogram next year, disussed SBE -Recommended dermatology for routine, annual skin survey -Routine physical activity encouraged -RTO one year or sooner PRN for any new problems, questions or concerns

## 2023-08-30 NOTE — PHYSICAL EXAM
[Chaperone Present] : A chaperone was present in the examining room during all aspects of the physical examination [Appropriately responsive] : appropriately responsive [Alert] : alert [No Acute Distress] : no acute distress [Soft] : soft [Non-tender] : non-tender [Non-distended] : non-distended [No Mass] : no mass [Oriented x3] : oriented x3 [Examination Of The Breasts] : a normal appearance [No Masses] : no breast masses were palpable [Labia Majora] : normal [Labia Minora] : normal [Normal] : normal [Uterine Adnexae] : non-palpable [FreeTextEntry1] : MYAH Montejo  [Tenderness] : nontender

## 2023-08-30 NOTE — HISTORY OF PRESENT ILLNESS
[N] : Patient does not use contraception [Y] : Positive pregnancy history [Menarche Age: ____] : age at menarche was [unfilled] [Currently Active] : currently active [PapSmeardate] : 10/18/21 [TextBox_31] : NEG [HIVDate] : 03/18/21 [TextBox_53] : NEG [GonorrheaDate] : 02/26/21 [TextBox_63] : NEG [ChlamydiaDate] : 02/26/21 [TextBox_68] : NEG [HPVDate] : 10/18/21 [TextBox_78] : NEG [LMPDate] : 08/21/23 [PGHxTotal] : 2 [[facility]Full[facility]] : 2 [[facility]] : 2 [FreeTextEntry1] : 08/21/23

## 2023-09-01 LAB — HPV HIGH+LOW RISK DNA PNL CVX: NOT DETECTED

## 2023-09-02 LAB — CYTOLOGY CVX/VAG DOC THIN PREP: NORMAL

## 2023-09-29 NOTE — OB RN PATIENT PROFILE - NS_FINALEDD_OBGYN_ALL_OB_DT
Stable weight.   Recommendations:   Stay physically active. As tolerated alternate resistance training with stretching and cardio. Goal of 150 minutes per week of moderate intensity activity or 7,500 - 10,000 steps per day. Follow the Mediterranean Diet. Include whole fresh fruits, vegetables, olive oil, seeds, nuts, whole grains, cold water fish, salmon, mackerel and lean cuts of meat.  Do not drink sugary/diet carbonated beverages. Decrease portion sizes slightly which will result in an approximately 500-calorie deficit. Avoid fast or fried and processed food, especially canned foods. Avoid refined carbohydrates, white starchy foods, flour, white potato, bread, muffins, and cakes. Consider substituting one meal a day with a meal replacement such as Slim fast, lean cuisine, or weight watcher's. Follow a healthy diet that includes enough calcium, vitamin D and proteins for bone health.     30-Apr-2019

## 2023-10-11 ENCOUNTER — APPOINTMENT (OUTPATIENT)
Dept: OBGYN | Facility: CLINIC | Age: 33
End: 2023-10-11
Payer: COMMERCIAL

## 2023-10-11 VITALS
HEIGHT: 65 IN | DIASTOLIC BLOOD PRESSURE: 68 MMHG | BODY MASS INDEX: 27.05 KG/M2 | SYSTOLIC BLOOD PRESSURE: 102 MMHG | WEIGHT: 162.38 LBS

## 2023-10-11 DIAGNOSIS — N93.9 ABNORMAL UTERINE AND VAGINAL BLEEDING, UNSPECIFIED: ICD-10-CM

## 2023-10-11 PROCEDURE — 99213 OFFICE O/P EST LOW 20 MIN: CPT

## 2023-10-23 ENCOUNTER — APPOINTMENT (OUTPATIENT)
Dept: ANTEPARTUM | Facility: CLINIC | Age: 33
End: 2023-10-23

## 2023-10-23 ENCOUNTER — APPOINTMENT (OUTPATIENT)
Dept: OBGYN | Facility: CLINIC | Age: 33
End: 2023-10-23

## 2023-10-30 ENCOUNTER — ASOB RESULT (OUTPATIENT)
Age: 33
End: 2023-10-30

## 2023-10-30 ENCOUNTER — APPOINTMENT (OUTPATIENT)
Dept: ANTEPARTUM | Facility: CLINIC | Age: 33
End: 2023-10-30

## 2023-10-30 ENCOUNTER — APPOINTMENT (OUTPATIENT)
Dept: OBGYN | Facility: CLINIC | Age: 33
End: 2023-10-30

## 2023-10-30 ENCOUNTER — APPOINTMENT (OUTPATIENT)
Dept: OBGYN | Facility: CLINIC | Age: 33
End: 2023-10-30
Payer: COMMERCIAL

## 2023-10-30 ENCOUNTER — APPOINTMENT (OUTPATIENT)
Dept: ANTEPARTUM | Facility: CLINIC | Age: 33
End: 2023-10-30
Payer: COMMERCIAL

## 2023-10-30 VITALS
BODY MASS INDEX: 26.99 KG/M2 | HEIGHT: 65 IN | DIASTOLIC BLOOD PRESSURE: 74 MMHG | SYSTOLIC BLOOD PRESSURE: 112 MMHG | WEIGHT: 162 LBS

## 2023-10-30 DIAGNOSIS — Z13.29 ENCOUNTER FOR SCREENING FOR OTHER SUSPECTED ENDOCRINE DISORDER: ICD-10-CM

## 2023-10-30 DIAGNOSIS — Z13.1 ENCOUNTER FOR SCREENING FOR DIABETES MELLITUS: ICD-10-CM

## 2023-10-30 DIAGNOSIS — N92.6 IRREGULAR MENSTRUATION, UNSPECIFIED: ICD-10-CM

## 2023-10-30 PROCEDURE — 76830 TRANSVAGINAL US NON-OB: CPT

## 2023-10-30 PROCEDURE — 36415 COLL VENOUS BLD VENIPUNCTURE: CPT

## 2023-10-30 PROCEDURE — 99213 OFFICE O/P EST LOW 20 MIN: CPT

## 2023-10-31 LAB
ANTI-MUELLERIAN HORMONE: 8.82 NG/ML
ESTIMATED AVERAGE GLUCOSE: 114 MG/DL
ESTRADIOL SERPL-MCNC: 31 PG/ML
FSH SERPL-MCNC: 6.1 IU/L
HBA1C MFR BLD HPLC: 5.6 %
LH SERPL-ACNC: 7 IU/L
PROLACTIN SERPL-MCNC: 10.3 NG/ML
TESTOST SERPL-MCNC: 24.6 NG/DL
TSH SERPL-ACNC: 1.26 UIU/ML

## 2023-11-08 LAB — 17OHP SERPL-MCNC: 36 NG/DL

## 2024-04-01 NOTE — DISCHARGE NOTE OB - NS OB DC MMR REASON NOT RECEIVED
Are there any outstanding tasks in patient's chart?    n  2. Do we have outstanding/pending referrals?    n  3. Has the patient been seen in an ER, Urgent Care, or admitted since last visit?    n  4. Has patient seen any other health care providers since last visit?    n  5.  Has patient had any blood work or x-rays done since last visit?   Labs completed Nov 2023   Contraindicated

## 2024-06-18 NOTE — OB PROVIDER IHI INDUCTION/AUGMENTATION NOTE - LABOR: BISHOP SCORE
DECISION MAKING     Initial Differential: Includes but is not limited to ankle sprain vs strain vs fracture     MDM/Assessment     Medical Decision Making  76-year-old female presents to the emergency department post fall.  No LOC.   Physical exam significant for left ankle pain on eversion.   X-ray of the ankle showed no  fracture of the ankle or tibia or fibula.   Patient will be given a left foot brace.   Patient is able to bear weight and ambulate, she does use a cane normally.    Suitable for discharge follow up with PCP.     ED COURSE   ED Medications administered this visit (None if left blank):   Medications   acetaminophen (TYLENOL) tablet 1,000 mg (1,000 mg Oral Given 6/18/24 1957)            Procedures: (None if left blank)  Procedures:     Consultants:  None    Decision Rules/Clinical Scores utilized:  Not Applicable     CRITICAL CARE:  None    MEDICATION CHANGES     Discharge Medication List as of 6/18/2024  2:21 PM          FINAL IMPRESSION     Final diagnoses:   Injury of left ankle, initial encounter       DISPOSITION   Condition: condition: fair  Dispo: Discharge to home  DISPOSITION Decision To Discharge 06/18/2024 02:16:58 PM        Outpatient Follow-Up:  Moris Kearney MD  [address] [place] [address]  [phone]    Go in 3 days  As needed, If symptoms worsen      DISCHARGE MEDICATIONS:  Discharge Medication List as of 6/18/2024  2:21 PM            This transcription was electronically signed. Parts of this transcriptions may have been dictated by use of voice recognition software and electronically transcribed. The transcription may contain errors not detected in proofreading. Please refer to my supervising physician's documentation if my documentation differs.    Electronically Signed: Go Tena DO, 06/18/24, 2:28 PM       6

## 2024-08-07 ENCOUNTER — APPOINTMENT (OUTPATIENT)
Dept: OBGYN | Facility: CLINIC | Age: 34
End: 2024-08-07

## 2024-08-07 PROCEDURE — 99213 OFFICE O/P EST LOW 20 MIN: CPT

## 2024-08-11 PROBLEM — Z31.69 ENCOUNTER FOR PRECONCEPTION CONSULTATION: Status: ACTIVE | Noted: 2024-08-11

## 2024-08-11 NOTE — HISTORY OF PRESENT ILLNESS
[FreeTextEntry1] : Molly presents for follow up of irregular cycles, PCOS, and starting TTC.  Cycle length has been variable but is more or less 30-35 days. She is not having pain during intercourse.  Previous bloodwork was normal except mild AMH elevation at 8.  She states that she has been more angry than usual and is not sure if this is related.

## 2024-08-11 NOTE — DISCUSSION/SUMMARY
[FreeTextEntry1] : We discussed optimal timing for intercourse for conception. She should use a cycle tracking bonifacio and have intercourse every other day during fertile window. Daily PNV recommended.  We discussed option of ovulation induction using letrozole in the future if needed.  If no success with conception in 6 months, she should be seen in the office.

## 2024-10-30 ENCOUNTER — NON-APPOINTMENT (OUTPATIENT)
Age: 34
End: 2024-10-30

## 2024-11-05 NOTE — OB RN PATIENT PROFILE - NSTOBACCONEVERSMOKERY/N_GEN_A
OCCUPATIONAL THERAPY EVALUATION  Type of Visit: Evaluation        Fall Risk Screen:  Fall screen completed by: OT  Have you fallen 2 or more times in the past year?: No  Have you fallen and had an injury in the past year?: Yes  Is patient a fall risk?: No  Fall screen comments: Does do exercises for balance, and feels manageable at this time    Subjective        Presenting condition or subjective complaint: (Patient-Rptd) wrist pain and lump  Date of onset: 10/10/24 (MD order date)    Relevant medical history:     Past Medical History:   Diagnosis Date    Anxiety     Arthritis     ASCUS of cervix with negative high risk HPV 01/23/2006    Asthma     Bursitis of shoulder 03/04/2010    Chronic rhinitis 03/25/2009    Coronary artery disease     Depression     Depressive disorder     Dyspnea on exertion     GERD (gastroesophageal reflux disease) 10/14/2008    Hypertension     Idiopathic cardiomyopathy (H) 01/08/2009    Indigestion     Itchy eyes     Left leg pain 06/16/2011    Motion sickness 02/27/2008    Nasal congestion     Pneumonia     Problems related to lack of adequate sleep     Ringing in ears     Sleep apnea 03/25/2009    Sneezing        Dates & types of surgery: (Patient-Rptd) bariatric    Prior diagnostic imaging/testing results: (Patient-Rptd) X-ray     Prior therapy history for the same diagnosis, illness or injury: (Patient-Rptd) No      Typing - finger pain  Weakness w/ grasp  Very intermittent paresthesia   Prior Level of Function  Indep     Living Environment  Social support: (Patient-Rptd) With a significant other or spouse   Type of home: (Patient-Rptd) House   Stairs to enter the home:         Ramp: (Patient-Rptd) No   Stairs inside the home: (Patient-Rptd) Yes (Patient-Rptd) 10 Is there a railing: (Patient-Rptd) Yes     Help at home: (Patient-Rptd) None  Equipment owned: (Patient-Rptd) Straight Cane; Grab bars; Raised toilet seat     Employment: (Patient-Rptd) Yes (Patient-Rptd)  -  planning to retire in 2 weeks   Hobbies/Interests: (Patient-Rptd) cooking reading    Patient goals for therapy: (Patient-Rptd) hard to stir    Pain assessment: Pain present     Objective   Upper Extremity Functional Index Score:  SCORE:   Column Totals: /80: (Patient-Rptd) 56   (A lower score indicates greater disability.)    Right hand dominant  Patient reports symptoms of pain, weakness/loss of strength, edema, numbness, and tingling     Pain Level (Scale 0-10)   11/5/2024   At Rest 0-1   With Use 5      Pain Description  Date 11/5/2024   Location wrist, hand, and thumb   Pain Quality Pressure   Frequency intermittent     Pain is worst  daytime   Exacerbated by  Gripping, pinching    Relieved by heat and bracing          ROM  Thumb 11/5/2024 11/5/2024   AROM  (PROM) L R   MP /70 /39   IP /80 /80   RABD 55 46   PABD 55 55   Retropulsion (cm) 4 3.5   Kapandji Opposition Scale (0-10/10) 10 10     ROM  Pain Report: - none  + mild    ++ moderate    +++ severe   Wrist 11/5/2024 11/5/2024   AROM (PROM) L R   Extension 65 74   Flexion 55 65   RD 15 10   UD 20 30   Supination 67 72   Pronation 90 90      Thumb Observation/Appearance   - none  + mild    ++ moderate    +++ severe     11/5/2024   Shoulder deformity present over CMC R: -  L: -   Edema over the CMC joint R: +  L: -   Noted collapse of MP into hyperextension during pinch R: + no collapse, just pain   L: -      Provocative Tests  Pain Report:  - none    + mild    ++ moderate    +++ severe     11/5/2024   AP Joint Laxity of Trapezium R: -  L: -   Crepitus present R: -  L: -   CMC Adduction Stress Test R: +  L: -   CMC Extension Stress Test R: -  L: -   Finkelstein's R: -  L: -           Strength   (Measured in pounds)  Pain Report: - none  + mild    ++ moderate    +++ severe    11/5/2024 11/5/2024   Trials L R   1  2  3 41 35+ dorsal hand   Average       Lat Pinch 11/5/2024 11/5/2024   Trials L R   1  2  3 7 8   Average       3 Pt Pinch 11/5/2024 11/5/2024    Trials L R   1  2  3 6 6   Average       Palpation   Pain Report:  - none    + mild    ++ moderate    +++ severe    11/5/2024   CMC Joint Line R: +  L: -   Thenar Eminence R: -  L: -   Web Space R: +  L: -   1st DC R: -  L: -   Radial Styloid R: -  L: -   FCR R: -  L: -       Assessment & Plan   CLINICAL IMPRESSIONS  Medical Diagnosis: R wrist pain, STT arthritis    Treatment Diagnosis: R wrist pain    Impression/Assessment: Pt is a 65 year old female presenting to Occupational Therapy due to R wrist pain.  The following significant findings have been identified: Impaired activity tolerance, Impaired ROM, Impaired strength, and Pain.  These identified deficits interfere with their ability to perform recreational activities, household chores, and meal planning and preparation as compared to previous level of function.   Patient's limitations or Problem List includes: Pain, Decreased ROM/motion, Weakness, and Tightness in musculature of the right wrist, hand, and thumb which interferes with the patient's ability to perform Recreational Activities and Household Chores as compared to previous level of function.    Clinical Decision Making (Complexity):  Assessment of Occupational Performance: 3-5 Performance Deficits  Occupational Performance Limitations: home establishment and management, meal preparation and cleanup, and leisure activities  Clinical Decision Making (Complexity): Low complexity    PLAN OF CARE  Treatment Interventions:  Modalities:  US and Paraffin  Therapeutic Exercise:  AROM, PROM, Isometrics, and Stabilization  Neuromuscular re-education:  Nerve Gliding, Proprioceptive Training, and Kinesiotaping  Manual Techniques:  Joint mobilization and Myofascial release  Orthotic Fabrication:  Static  Self Care:  Self Care Tasks and Ergonomic Considerations    Long Term Goals   OT Goal 1  Goal Description: Pt will report ability to open a jar w/out pain or difficulty, w/ use of AE as needed  Rationale: In  order to maximize safety and independence with ADL/IADLs  Target Date: 01/01/25      Frequency of Treatment: 1x/week  Duration of Treatment: 8 weeks     Recommended Referrals to Other Professionals:  None  Education Assessment: Learner/Method: Patient  Education Comments: No barriers to learning     Risks and benefits of evaluation/treatment have been explained.   Patient/Family/caregiver agrees with Plan of Care.     Evaluation Time:    OT Oliver, Low Complexity Minutes (39238): 25       Signing Clinician: KATHERINE Tobias                     No

## 2024-11-12 NOTE — DISCHARGE NOTE OB - USE THE FOOD PYRAMID (LOCATED IN DISCHARGE MATERIALS PROVIDED) AS A GUIDE TO BALANCE AND MODERATION
----- Message from Wild Mcclellan MD sent at 11/12/2024 11:20 AM CST -----  Message sent to the patient in the portal:  Cologuard test negative.  Repeat in 3 years  
Pt notified through portal   
Statement Selected

## 2024-11-20 ENCOUNTER — APPOINTMENT (OUTPATIENT)
Dept: OBGYN | Facility: CLINIC | Age: 34
End: 2024-11-20
Payer: COMMERCIAL

## 2024-11-20 VITALS
DIASTOLIC BLOOD PRESSURE: 70 MMHG | SYSTOLIC BLOOD PRESSURE: 120 MMHG | WEIGHT: 166 LBS | HEIGHT: 65 IN | BODY MASS INDEX: 27.66 KG/M2

## 2024-11-20 DIAGNOSIS — Z12.31 ENCOUNTER FOR SCREENING MAMMOGRAM FOR MALIGNANT NEOPLASM OF BREAST: ICD-10-CM

## 2024-11-20 DIAGNOSIS — Z01.419 ENCOUNTER FOR GYNECOLOGICAL EXAMINATION (GENERAL) (ROUTINE) W/OUT ABNORMAL FINDINGS: ICD-10-CM

## 2024-11-20 DIAGNOSIS — Z12.4 ENCOUNTER FOR SCREENING FOR MALIGNANT NEOPLASM OF CERVIX: ICD-10-CM

## 2024-11-20 PROCEDURE — 99395 PREV VISIT EST AGE 18-39: CPT

## 2024-11-20 PROCEDURE — 99459 PELVIC EXAMINATION: CPT

## 2024-11-20 RX ORDER — MULTIVITAMIN
TABLET ORAL
Refills: 0 | Status: ACTIVE | COMMUNITY

## 2024-11-22 LAB — HPV HIGH+LOW RISK DNA PNL CVX: NOT DETECTED

## 2024-11-25 LAB — CYTOLOGY CVX/VAG DOC THIN PREP: NORMAL

## 2025-04-30 ENCOUNTER — OFFICE (OUTPATIENT)
Dept: URBAN - METROPOLITAN AREA CLINIC 12 | Facility: CLINIC | Age: 35
Setting detail: OPHTHALMOLOGY
End: 2025-04-30
Payer: COMMERCIAL

## 2025-04-30 VITALS — WEIGHT: 160 LBS | HEIGHT: 64 IN

## 2025-04-30 DIAGNOSIS — H01.004: ICD-10-CM

## 2025-04-30 DIAGNOSIS — H43.393: ICD-10-CM

## 2025-04-30 DIAGNOSIS — H16.223: ICD-10-CM

## 2025-04-30 DIAGNOSIS — H01.001: ICD-10-CM

## 2025-04-30 PROCEDURE — 92004 COMPRE OPH EXAM NEW PT 1/>: CPT | Performed by: OPHTHALMOLOGY

## 2025-04-30 ASSESSMENT — REFRACTION_MANIFEST
OD_SPHERE: +0.50
OS_CYLINDER: -0.25
OS_AXIS: 135
OS_SPHERE: +0.50
OD_AXIS: 040
OD_CYLINDER: -0.25
OS_VA1: 20/NI
OD_VA1: 20/NI

## 2025-04-30 ASSESSMENT — CONFRONTATIONAL VISUAL FIELD TEST (CVF)
OD_FINDINGS: FULL
OS_FINDINGS: FULL

## 2025-04-30 ASSESSMENT — VISUAL ACUITY
OS_BCVA: 20/20
OD_BCVA: 20/20

## 2025-04-30 ASSESSMENT — TONOMETRY
OD_IOP_MMHG: 18
OS_IOP_MMHG: 11

## 2025-05-16 ENCOUNTER — NON-APPOINTMENT (OUTPATIENT)
Age: 35
End: 2025-05-16

## 2025-07-18 ENCOUNTER — TRANSCRIPTION ENCOUNTER (OUTPATIENT)
Age: 35
End: 2025-07-18

## 2025-07-21 ENCOUNTER — APPOINTMENT (OUTPATIENT)
Dept: OBGYN | Facility: CLINIC | Age: 35
End: 2025-07-21

## 2025-07-24 ENCOUNTER — TRANSCRIPTION ENCOUNTER (OUTPATIENT)
Age: 35
End: 2025-07-24